# Patient Record
Sex: MALE | Race: BLACK OR AFRICAN AMERICAN | Employment: FULL TIME | URBAN - METROPOLITAN AREA
[De-identification: names, ages, dates, MRNs, and addresses within clinical notes are randomized per-mention and may not be internally consistent; named-entity substitution may affect disease eponyms.]

---

## 2017-01-30 ENCOUNTER — MED REC SCAN ONLY (OUTPATIENT)
Dept: NEPHROLOGY | Facility: CLINIC | Age: 57
End: 2017-01-30

## 2017-01-31 ENCOUNTER — OFFICE VISIT (OUTPATIENT)
Dept: NEPHROLOGY | Facility: CLINIC | Age: 57
End: 2017-01-31

## 2017-01-31 VITALS
DIASTOLIC BLOOD PRESSURE: 84 MMHG | WEIGHT: 197 LBS | SYSTOLIC BLOOD PRESSURE: 108 MMHG | HEART RATE: 74 BPM | BODY MASS INDEX: 30 KG/M2 | RESPIRATION RATE: 16 BRPM

## 2017-01-31 DIAGNOSIS — N18.4 CKD (CHRONIC KIDNEY DISEASE), STAGE 4 (SEVERE): ICD-10-CM

## 2017-01-31 DIAGNOSIS — N20.0 NEPHROLITHIASIS: Primary | ICD-10-CM

## 2017-01-31 DIAGNOSIS — R10.9 FLANK PAIN: ICD-10-CM

## 2017-01-31 PROCEDURE — 99243 OFF/OP CNSLTJ NEW/EST LOW 30: CPT | Performed by: INTERNAL MEDICINE

## 2017-01-31 RX ORDER — LISINOPRIL 5 MG/1
5 TABLET ORAL DAILY
COMMUNITY

## 2017-01-31 NOTE — PROGRESS NOTES
Nephrology Consult Note    REASON FOR CONSULT: CKD 4 / proteinuria    ASSESSMENT/PLAN:        1) CKD 4 with proteinuria- pt with history of biopsy-proven membranous nephropathy in 2012 which is commonly seen as a primary glomerulopathy but can also be asso Denies any NSAID or PPI use. He denies any history of obstructive uropathy although he has been experiencing left-sided flank pain recently. He denies any recent hospital lesions or illnesses. No other new diagnoses.   He generally feels well and remains extremities  Skin: Warm and dry, no rashes        Robbin Rojas MD  1/31/2017  12:48 PM

## 2017-04-03 ENCOUNTER — TELEPHONE (OUTPATIENT)
Dept: NEPHROLOGY | Facility: CLINIC | Age: 57
End: 2017-04-03

## 2017-04-03 NOTE — TELEPHONE ENCOUNTER
Left message for pt- Cr up from 2.6->2.9; await CT abd (ordered) to eval stone burden, etc and then consider renal biopsy pending results- gunnar coon

## 2017-04-07 ENCOUNTER — HOSPITAL ENCOUNTER (OUTPATIENT)
Dept: CT IMAGING | Age: 57
Discharge: HOME | End: 2017-04-07
Attending: INTERNAL MEDICINE
Payer: COMMERCIAL

## 2017-04-07 DIAGNOSIS — N20.0 NEPHROLITHIASIS: ICD-10-CM

## 2017-04-07 DIAGNOSIS — R10.9 FLANK PAIN: ICD-10-CM

## 2017-04-07 DIAGNOSIS — N18.4 CKD (CHRONIC KIDNEY DISEASE), STAGE 4 (SEVERE): ICD-10-CM

## 2017-04-07 PROCEDURE — 74176 CT ABD & PELVIS W/O CONTRAST: CPT

## 2017-05-02 ENCOUNTER — TELEPHONE (OUTPATIENT)
Dept: NEPHROLOGY | Facility: CLINIC | Age: 57
End: 2017-05-02

## 2017-05-02 NOTE — TELEPHONE ENCOUNTER
Left VM x 2 for pt re: CT results; no stone disease / obstruction. Cr 2.9 likely due to progression of membranous nephropathy.  Asked pt to make f/u office appt to discuss-gunnar coon

## 2017-06-05 ENCOUNTER — OFFICE VISIT (OUTPATIENT)
Dept: NEPHROLOGY | Facility: CLINIC | Age: 57
End: 2017-06-05

## 2017-06-05 VITALS
RESPIRATION RATE: 16 BRPM | WEIGHT: 199 LBS | BODY MASS INDEX: 30 KG/M2 | HEART RATE: 76 BPM | DIASTOLIC BLOOD PRESSURE: 92 MMHG | SYSTOLIC BLOOD PRESSURE: 138 MMHG

## 2017-06-05 DIAGNOSIS — N18.4 CKD (CHRONIC KIDNEY DISEASE) STAGE 4, GFR 15-29 ML/MIN (HCC): ICD-10-CM

## 2017-06-05 DIAGNOSIS — R80.9 PROTEINURIA, UNSPECIFIED: ICD-10-CM

## 2017-06-05 DIAGNOSIS — N17.9 AKI (ACUTE KIDNEY INJURY) (HCC): Primary | ICD-10-CM

## 2017-06-05 DIAGNOSIS — N02.2 MEMBRANOUS NEPHROPATHY DETERMINED BY BIOPSY: ICD-10-CM

## 2017-06-05 PROCEDURE — 99214 OFFICE O/P EST MOD 30 MIN: CPT | Performed by: INTERNAL MEDICINE

## 2017-06-05 NOTE — PROGRESS NOTES
Nephrology Progress Note      ASSESSMENT/PLAN:         1) CKD 4 with proteinuria- pt with history of biopsy-proven membranous nephropathy in 2012 which is commonly seen as a primary glomerulopathy but can also be associated with HIV and is often difficult Never Smoker                      Smokeless Status: Never Used                           Medications (Active prior to today's visit):    Current Outpatient Prescriptions:  lisinopril 5 MG Oral Tab Take 5 mg by mouth daily.  Disp:  Rfl:    EmtricMaru

## 2017-06-07 ENCOUNTER — TELEPHONE (OUTPATIENT)
Dept: NEPHROLOGY | Facility: CLINIC | Age: 57
End: 2017-06-07

## 2017-06-07 DIAGNOSIS — N18.4 CKD (CHRONIC KIDNEY DISEASE) STAGE 4, GFR 15-29 ML/MIN (HCC): Primary | ICD-10-CM

## 2017-06-07 DIAGNOSIS — R80.9 PROTEINURIA, UNSPECIFIED: ICD-10-CM

## 2018-08-08 ENCOUNTER — PATIENT OUTREACH (OUTPATIENT)
Dept: INFECTIOUS DISEASE | Facility: CLINIC | Age: 58
End: 2018-08-08

## 2018-08-08 DIAGNOSIS — M54.2 NECK PAIN: Primary | ICD-10-CM

## 2018-08-08 NOTE — PROGRESS NOTES
INFECTIOUS DISEASE PROGRESS NOTE    Sadie Berman     1960 MRN [id number]   Location: [facility]               Subjective:  :FU HIV  ---was seeing Dr. Sapna Mcnulty for renal failure and had ordered renal biopsy l

## 2018-08-10 NOTE — PROGRESS NOTES
Labs from [facility] : creat 3.34  -will need to dc truvada, switch to AZT/3TC renally adjusted, and cont isentress  Advised patient fu Dr. Wesley Helm -- he refused renal biopsy 1 year ago

## 2018-08-14 ENCOUNTER — TELEPHONE (OUTPATIENT)
Dept: NEPHROLOGY | Facility: CLINIC | Age: 58
End: 2018-08-14

## 2018-08-14 NOTE — TELEPHONE ENCOUNTER
Per almas Stone for pt to call to schedule office appt due to worsening kidney function. Any time in September is ok per Dr Jonny Priest.

## 2025-01-29 ENCOUNTER — APPOINTMENT (OUTPATIENT)
Dept: GENERAL RADIOLOGY | Facility: HOSPITAL | Age: 65
End: 2025-01-29
Attending: EMERGENCY MEDICINE
Payer: COMMERCIAL

## 2025-01-29 ENCOUNTER — PATIENT OUTREACH (OUTPATIENT)
Dept: INFECTIOUS DISEASE | Facility: CLINIC | Age: 65
End: 2025-01-29

## 2025-01-29 ENCOUNTER — HOSPITAL ENCOUNTER (INPATIENT)
Facility: HOSPITAL | Age: 65
LOS: 1 days | Discharge: LEFT BEFORE DISCHARGE | End: 2025-01-30
Attending: EMERGENCY MEDICINE | Admitting: HOSPITALIST
Payer: COMMERCIAL

## 2025-01-29 DIAGNOSIS — I16.0 HYPERTENSIVE URGENCY: ICD-10-CM

## 2025-01-29 DIAGNOSIS — N18.9 ACUTE RENAL FAILURE SUPERIMPOSED ON CHRONIC KIDNEY DISEASE, UNSPECIFIED ACUTE RENAL FAILURE TYPE, UNSPECIFIED CKD STAGE: Primary | ICD-10-CM

## 2025-01-29 DIAGNOSIS — N17.9 ACUTE RENAL FAILURE SUPERIMPOSED ON CHRONIC KIDNEY DISEASE, UNSPECIFIED ACUTE RENAL FAILURE TYPE, UNSPECIFIED CKD STAGE: Primary | ICD-10-CM

## 2025-01-29 DIAGNOSIS — Z21 HIV INFECTION, UNSPECIFIED SYMPTOM STATUS (HCC): ICD-10-CM

## 2025-01-29 PROBLEM — N18.4 CKD (CHRONIC KIDNEY DISEASE) STAGE 4, GFR 15-29 ML/MIN (HCC): Status: ACTIVE | Noted: 2025-01-29

## 2025-01-29 PROBLEM — N05.9 GLOMERULONEPHRITIS: Status: ACTIVE | Noted: 2025-01-29

## 2025-01-29 PROBLEM — E87.20 METABOLIC ACIDOSIS: Status: ACTIVE | Noted: 2025-01-29

## 2025-01-29 PROBLEM — D69.6 THROMBOCYTOPENIA: Status: ACTIVE | Noted: 2025-01-29

## 2025-01-29 PROBLEM — E87.0 HYPERNATREMIA: Status: ACTIVE | Noted: 2025-01-29

## 2025-01-29 LAB
ALBUMIN SERPL-MCNC: 4.2 G/DL (ref 3.2–4.8)
ALBUMIN/GLOB SERPL: 1.1 {RATIO} (ref 1–2)
ALP LIVER SERPL-CCNC: 60 U/L
ALT SERPL-CCNC: 22 U/L
ANION GAP SERPL CALC-SCNC: 15 MMOL/L (ref 0–18)
AST SERPL-CCNC: 15 U/L (ref ?–34)
BASOPHILS # BLD AUTO: 0.01 X10(3) UL (ref 0–0.2)
BASOPHILS NFR BLD AUTO: 0.2 %
BILIRUB SERPL-MCNC: 0.7 MG/DL (ref 0.2–1.1)
BILIRUB UR QL STRIP.AUTO: NEGATIVE
BUN BLD-MCNC: 63 MG/DL (ref 9–23)
CALCIUM BLD-MCNC: 8.9 MG/DL (ref 8.7–10.6)
CHLORIDE SERPL-SCNC: 116 MMOL/L (ref 98–112)
CLARITY UR REFRACT.AUTO: CLEAR
CO2 SERPL-SCNC: 16 MMOL/L (ref 21–32)
COLOR UR AUTO: COLORLESS
CREAT BLD-MCNC: 8.49 MG/DL
CREAT UR-SCNC: 29.3 MG/DL
EGFRCR SERPLBLD CKD-EPI 2021: 6 ML/MIN/1.73M2 (ref 60–?)
EOSINOPHIL # BLD AUTO: 0.39 X10(3) UL (ref 0–0.7)
EOSINOPHIL NFR BLD AUTO: 9.4 %
ERYTHROCYTE [DISTWIDTH] IN BLOOD BY AUTOMATED COUNT: 15.6 %
GLOBULIN PLAS-MCNC: 3.7 G/DL (ref 2–3.5)
GLUCOSE BLD-MCNC: 90 MG/DL (ref 70–99)
GLUCOSE UR STRIP.AUTO-MCNC: NORMAL MG/DL
HCT VFR BLD AUTO: 36.9 %
HGB BLD-MCNC: 12.4 G/DL
IMM GRANULOCYTES # BLD AUTO: 0.02 X10(3) UL (ref 0–1)
IMM GRANULOCYTES NFR BLD: 0.5 %
KETONES UR STRIP.AUTO-MCNC: NEGATIVE MG/DL
LEUKOCYTE ESTERASE UR QL STRIP.AUTO: NEGATIVE
LYMPHOCYTES # BLD AUTO: 0.9 X10(3) UL (ref 1–4)
LYMPHOCYTES NFR BLD AUTO: 21.8 %
MCH RBC QN AUTO: 30.2 PG (ref 26–34)
MCHC RBC AUTO-ENTMCNC: 33.6 G/DL (ref 31–37)
MCV RBC AUTO: 90 FL
MONOCYTES # BLD AUTO: 0.35 X10(3) UL (ref 0.1–1)
MONOCYTES NFR BLD AUTO: 8.5 %
NEUTROPHILS # BLD AUTO: 2.46 X10 (3) UL (ref 1.5–7.7)
NEUTROPHILS # BLD AUTO: 2.46 X10(3) UL (ref 1.5–7.7)
NEUTROPHILS NFR BLD AUTO: 59.6 %
NITRITE UR QL STRIP.AUTO: NEGATIVE
OSMOLALITY SERPL CALC.SUM OF ELEC: 322 MOSM/KG (ref 275–295)
PH UR STRIP.AUTO: 6 [PH] (ref 5–8)
PLATELET # BLD AUTO: 140 10(3)UL (ref 150–450)
POTASSIUM SERPL-SCNC: 4.7 MMOL/L (ref 3.5–5.1)
PROT SERPL-MCNC: 7.9 G/DL (ref 5.7–8.2)
PROT UR STRIP.AUTO-MCNC: 50 MG/DL
PROT UR-MCNC: 58 MG/DL (ref ?–14)
RBC # BLD AUTO: 4.1 X10(6)UL
SODIUM SERPL-SCNC: 147 MMOL/L (ref 136–145)
SP GR UR STRIP.AUTO: 1.01 (ref 1–1.03)
UROBILINOGEN UR STRIP.AUTO-MCNC: NORMAL MG/DL
WBC # BLD AUTO: 4.1 X10(3) UL (ref 4–11)

## 2025-01-29 PROCEDURE — 99223 1ST HOSP IP/OBS HIGH 75: CPT | Performed by: HOSPITALIST

## 2025-01-29 PROCEDURE — 99223 1ST HOSP IP/OBS HIGH 75: CPT | Performed by: INTERNAL MEDICINE

## 2025-01-29 PROCEDURE — 71045 X-RAY EXAM CHEST 1 VIEW: CPT | Performed by: EMERGENCY MEDICINE

## 2025-01-29 RX ORDER — BISACODYL 10 MG
10 SUPPOSITORY, RECTAL RECTAL
Status: DISCONTINUED | OUTPATIENT
Start: 2025-01-29 | End: 2025-01-30

## 2025-01-29 RX ORDER — ONDANSETRON 2 MG/ML
4 INJECTION INTRAMUSCULAR; INTRAVENOUS EVERY 4 HOURS PRN
Status: DISCONTINUED | OUTPATIENT
Start: 2025-01-29 | End: 2025-01-29 | Stop reason: HOSPADM

## 2025-01-29 RX ORDER — ACETAMINOPHEN 500 MG
500 TABLET ORAL EVERY 4 HOURS PRN
Status: DISCONTINUED | OUTPATIENT
Start: 2025-01-29 | End: 2025-01-30

## 2025-01-29 RX ORDER — PROCHLORPERAZINE EDISYLATE 5 MG/ML
5 INJECTION INTRAMUSCULAR; INTRAVENOUS EVERY 8 HOURS PRN
Status: DISCONTINUED | OUTPATIENT
Start: 2025-01-29 | End: 2025-01-30

## 2025-01-29 RX ORDER — HEPARIN SODIUM 5000 [USP'U]/ML
5000 INJECTION, SOLUTION INTRAVENOUS; SUBCUTANEOUS EVERY 12 HOURS SCHEDULED
Status: DISCONTINUED | OUTPATIENT
Start: 2025-01-29 | End: 2025-01-30

## 2025-01-29 RX ORDER — AMLODIPINE BESYLATE 10 MG/1
10 TABLET ORAL DAILY
Status: DISCONTINUED | OUTPATIENT
Start: 2025-01-29 | End: 2025-01-30

## 2025-01-29 RX ORDER — LABETALOL HYDROCHLORIDE 5 MG/ML
10 INJECTION, SOLUTION INTRAVENOUS EVERY 6 HOURS PRN
Status: DISCONTINUED | OUTPATIENT
Start: 2025-01-29 | End: 2025-01-30

## 2025-01-29 RX ORDER — HYDRALAZINE HYDROCHLORIDE 20 MG/ML
10 INJECTION INTRAMUSCULAR; INTRAVENOUS EVERY 4 HOURS PRN
Status: DISCONTINUED | OUTPATIENT
Start: 2025-01-29 | End: 2025-01-30

## 2025-01-29 RX ORDER — FUROSEMIDE 10 MG/ML
40 INJECTION INTRAMUSCULAR; INTRAVENOUS ONCE
Status: COMPLETED | OUTPATIENT
Start: 2025-01-29 | End: 2025-01-29

## 2025-01-29 RX ORDER — METOPROLOL TARTRATE 25 MG/1
25 TABLET, FILM COATED ORAL
Status: DISCONTINUED | OUTPATIENT
Start: 2025-01-29 | End: 2025-01-30

## 2025-01-29 RX ORDER — LISINOPRIL 20 MG/1
20 TABLET ORAL DAILY
Status: DISCONTINUED | OUTPATIENT
Start: 2025-01-29 | End: 2025-01-29 | Stop reason: DRUGHIGH

## 2025-01-29 RX ORDER — HYDROMORPHONE HYDROCHLORIDE 1 MG/ML
0.5 INJECTION, SOLUTION INTRAMUSCULAR; INTRAVENOUS; SUBCUTANEOUS EVERY 30 MIN PRN
Status: ACTIVE | OUTPATIENT
Start: 2025-01-29 | End: 2025-01-29

## 2025-01-29 RX ORDER — POLYETHYLENE GLYCOL 3350 17 G/17G
17 POWDER, FOR SOLUTION ORAL DAILY PRN
Status: DISCONTINUED | OUTPATIENT
Start: 2025-01-29 | End: 2025-01-30

## 2025-01-29 RX ORDER — SENNOSIDES 8.6 MG
17.2 TABLET ORAL NIGHTLY PRN
Status: DISCONTINUED | OUTPATIENT
Start: 2025-01-29 | End: 2025-01-30

## 2025-01-29 RX ORDER — ONDANSETRON 2 MG/ML
4 INJECTION INTRAMUSCULAR; INTRAVENOUS EVERY 6 HOURS PRN
Status: DISCONTINUED | OUTPATIENT
Start: 2025-01-29 | End: 2025-01-30

## 2025-01-29 RX ORDER — LISINOPRIL 2.5 MG/1
2.5 TABLET ORAL DAILY
Status: DISCONTINUED | OUTPATIENT
Start: 2025-01-29 | End: 2025-01-30

## 2025-01-29 NOTE — PROGRESS NOTES
Last seen in 2021, stopped all meds.  Here to re establish HIV care.  [facility]  He has NOT yet seen primary physician.  BP in office is 202/120, and he reports shortness of breath with exertion over the last 3 weeks.  02 sat is 96% at rest on room air, lungs are clear, no edema.  Labs show creat is 7.6.  He was advised to go to ED urgently for hypertensive urgency, sob, in the setting of A/CRF.       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

## 2025-01-29 NOTE — H&P
[facility]     Tariq Joe Patient Status:  Emergency    1960 MRN [id number]   Location [facility] EMERGENCY DEPARTMENT Attending Scot Thomas MD   Hosp Day # 0 PCP No primary care provider on file.     Chief Complaint: RAF    Subjective:    History of Present Illness:     Tariq Joe is a 64 year old male with medical history of HIV not on medications since summer of 2022 who went to see Infectious disease as an outpatient today to establish care.  He was having symptoms of shortness of breath for the past 3 weeks and was noted to have a BP of 202/120.  He had labs done which showed a Cr of 7.6 and he was advised to go to the ER for further care.    He denies a cough, fever, chills, palpitations.      History/Other:    Past Medical History:  History reviewed. No pertinent past medical history.  Past Surgical History:   History reviewed. No pertinent surgical history.   Family History:   No family history on file.  Social History:    reports that he has never smoked. He has never used smokeless tobacco. He reports that he does not drink alcohol and does not use drugs.     Allergies: Allergies[1]    Medications:  Medications Ordered Prior to Encounter[2]    Review of Systems:   A comprehensive review of systems was completed.    Pertinent positives and negatives noted in the HPI.    Objective:   Physical Exam:    BP (!) 179/125   Pulse 104   Temp 97.2 °F (36.2 °C) (Temporal)   Resp 22   Ht 5' 8\" (1.727 m)   Wt 203 lb (92.1 kg)   SpO2 97%   BMI 30.87 kg/m²   General: No acute distress, Alert  Respiratory: No rhonchi, no wheezes  Cardiovascular: S1, S2. Regular rate and rhythm  Abdomen: Soft, Non-tender, non-distended, positive bowel sounds  Neuro: No new focal deficits  Extremities: No edema      Results:    Labs:      Labs Last 24 Hours:    Recent Labs   Lab 25  1406   RBC 4.10*   HGB 12.4*   HCT 36.9*   MCV 90.0   MCH 30.2   MCHC 33.6   RDW 15.6    NEPRELIM 2.46   WBC 4.1   .0*       Recent Labs   Lab 01/29/25  1406   GLU 90   BUN 63*   CREATSERUM 8.49*   EGFRCR 6*   CA 8.9   ALB 4.2   *   K 4.7   *   CO2 16.0*   ALKPHO 60   AST 15   ALT 22   BILT 0.7   TP 7.9       Estimated Glomerular Filtration Rate: 6.5 mL/min/1.73m2 (A) (by CKD-EPI based on SCr of 8.49 mg/dL (H)).    Lab Results   Component Value Date    PT 12.8 02/17/2012    PT 14.2 08/29/2011    PT 14.3 08/25/2011    INR 0.99 02/17/2012    INR 1.09 08/29/2011    INR 1.10 08/25/2011       No results for input(s): \"TROP\", \"TROPHS\", \"CK\" in the last 168 hours.    No results for input(s): \"TROP\", \"PBNP\" in the last 168 hours.    No results for input(s): \"PCT\" in the last 168 hours.    Imaging: Imaging data reviewed in Epic.    Assessment & Plan:      #Acute renal failure  -Cr 8.49  -renal consult  -nearing HD    #Hypernatremia    #Hypertensive urgency  -not on meds for hypertension, previously on lisinopril  -start labetalol IV  -resume lisinopril  -ECHO    #Shortness of breath  -suspect due to uncontrolled HTN  -CXR clear  -ECHO    #HIV  #h/o neurosyphilis  -ID on consult  -not on meds currently  -CD4- 4, VL- 75K        Plan of care discussed with patient and ER physician    Kamla Hernandez MD    Supplementary Documentation:     The 21st Century Cures Act makes medical notes like these available to patients in the interest of transparency. Please be advised this is a medical document. Medical documents are intended to carry relevant information, facts as evident, and the clinical opinion of the practitioner. The medical note is intended as peer to peer communication and may appear blunt or direct. It is written in medical language and may contain abbreviations or verbiage that are unfamiliar.                                       [1] No Known Allergies  [2]   No current facility-administered medications on file prior to encounter.     Current Outpatient Medications on File Prior to  Encounter   Medication Sig Dispense Refill    lisinopril 5 MG Oral Tab Take 1 tablet (5 mg total) by mouth daily.      Emtricitabine-Tenofovir (TRUVADA) 200-300 MG Oral Tab 1 TABLET DAILY      Raltegravir Potassium (ISENTRESS) 400 MG Oral Tab 1 TABLET TWICE DAILY

## 2025-01-29 NOTE — CONSULTS
[facility]  Report of Consultation    Tariq Joe Patient Status:  Emergency    1960 MRN [id number]   Location [facility] EMERGENCY DEPARTMENT Attending Scot Thomas MD   Hosp Day # 0 PCP No primary care provider on file.       Assessment / Plan:    1) CKD 5 / probable ESRD- h/o biopsy proven membranous nephropathy (primary vs secondary)  Cr 1.4 -> 3.0 2017; now likely with superimposed hypertensive nephrosclerosis- doubt reversibility regardless of etiology (GN, HIV, HTN). Lytes OK; + met acidosis, mild anemia, likely somewhat volume up. PLAN- eval urine / US; will need dialysis    2) Uncontrolled HTN- exac by CKD + med noncompliance. Start BB / CCB + IV GARCÍA    3) HIV- off HAART (truvada / isentress) x 2+ yrs; eval as per ID (Dr. Mendoza)      Reason for Consultation:  RAF / CKD / HTN    History of Present Illness:  Tariq Joe is a a(n) 64 year old male. Dictation to follow    History:  History reviewed. No pertinent past medical history.  History reviewed. No pertinent surgical history.  No family history on file.  Denies family history of kidney disease.    reports that he has never smoked. He has never used smokeless tobacco. He reports that he does not drink alcohol and does not use drugs.    Allergies:  Allergies[1]    Medications:  No current facility-administered medications for this encounter.  Prior to Admission Medications   Medication Sig    lisinopril 5 MG Oral Tab Take 1 tablet (5 mg total) by mouth daily.    Emtricitabine-Tenofovir (TRUVADA) 200-300 MG Oral Tab 1 TABLET DAILY    Raltegravir Potassium (ISENTRESS) 400 MG Oral Tab 1 TABLET TWICE DAILY       Review of Systems:  Please see HPI for pertinent positives. 10 point review of systems otherwise reviewed and negative.     Physical Exam:  BP (!) 179/125   Pulse 104   Temp 97.2 °F (36.2 °C) (Temporal)   Resp 22   Ht 5' 8\" (1.727 m)   Wt 203 lb (92.1 kg)   SpO2 97%   BMI 30.87 kg/m²   Temp (24hrs), Av.2 °F  (36.2 °C), Min:97.2 °F (36.2 °C), Max:97.2 °F (36.2 °C)     No intake or output data in the 24 hours ending 01/29/25 1537  Wt Readings from Last 3 Encounters:   01/29/25 203 lb (92.1 kg)   06/05/17 199 lb (90.3 kg)   01/31/17 197 lb (89.4 kg)     General: awake alert  HEENT: No scleral icterus, MMM  Neck: Supple, no TANI or thyromegaly  Cardiac: Regular rate and rhythm, S1, S2 normal, no murmur, rub, or gallop  Lungs: Decreased breath sounds at the bases bilaterally.   Abdomen: Soft, non-tender. + bowel sounds, no palpable organomegaly  Extremities: Without clubbing, cyanosis; trace LE edema  Neurologic: Cranial nerves grossly intact, moving all extremities  Skin: Warm and dry, no rashes      Laboratory Data:  Lab Results   Component Value Date    WBC 4.1 01/29/2025    HGB 12.4 01/29/2025    HCT 36.9 01/29/2025    .0 01/29/2025    CREATSERUM 8.49 01/29/2025    BUN 63 01/29/2025     01/29/2025    K 4.7 01/29/2025     01/29/2025    CO2 16.0 01/29/2025    GLU 90 01/29/2025    CA 8.9 01/29/2025    ALB 4.2 01/29/2025    ALKPHO 60 01/29/2025    BILT 0.7 01/29/2025    TP 7.9 01/29/2025    AST 15 01/29/2025    ALT 22 01/29/2025       Imaging:  All imaging studies reviewed.      Thank you for allowing me to participate in the care of your patient.    Robbin Frances MD  1/29/2025  3:37 PM         [1] No Known Allergies

## 2025-01-29 NOTE — ED INITIAL ASSESSMENT (HPI)
Pt in from home. Pt had blood drawn a couple weeks ago and had a follow up appointment with infectious disease who recommended he come here for elevated creatinine and shortness of breath. Pt says he has shortness of breath on exertion. Pt reports bilateral ankle swelling. No pain. Pt's BP elevated in triage. 230/106 first try on left arm. 218/127 second try on right arm.

## 2025-01-29 NOTE — ED PROVIDER NOTES
Patient Seen in: [facility] Emergency Department      History     Chief Complaint   Patient presents with    Abnormal Labs     Stated Complaint: elevated creatinine    Subjective:   HPI  64-year-old male who is HIV positive who presents to the emergency department stating that he was sent here due to his elevated creatinine.  He said he has been away from seeing his infectious disease physician and renal physician for the past 5 years.  He said it was due to economic constraints.  He went to see Dr. Mendoza from infectious disease earlier this week and had laboratories drawn and was told that his creatinine was elevated and he needs to come to the hospital.  The patient is also noticed his blood pressures been elevated.  He has not been compliant with any of his medications for the past 5 years.  He said he is been noticing increased abdominal girth and occasional shortness of breath.  Denies chest pain.  Reviewing his records he was last seen earlier today for patient not range on 1/29/2025 by infectious disease and then sent here for evaluation.  He was last seen by nephrology on 8/14/2018 with a phone call for an appointment question        Objective:     History reviewed. No pertinent past medical history.           History reviewed. No pertinent surgical history.             Social History     Socioeconomic History    Marital status:  (Not Legally)   Tobacco Use    Smoking status: Never    Smokeless tobacco: Never   Substance and Sexual Activity    Alcohol use: Never    Drug use: Never                  Physical Exam     ED Triage Vitals [01/29/25 1237]   BP (!) 218/127   Pulse 105   Resp 18   Temp 97.2 °F (36.2 °C)   Temp src Temporal   SpO2 98 %   O2 Device None (Room air)       Current Vitals:   Vital Signs  BP: (!) 198/123  Pulse: 97  Resp: 22  Temp: 97.2 °F (36.2 °C)  Temp src: Temporal  MAP (mmHg): (!) 143    Oxygen Therapy  SpO2: 100 %  O2 Device: None (Room air)        Physical  Exam  General: This a pleasant nontoxic appearing patient in no apparent distress alert and oriented ×3  HEENT: Pupils are equal reactive to light.  Extra ocular motions are intact.  No scleral icterus or conjunctival pallor.   Lungs: Clear to auscultation bilaterally.  No wheezes, rhonchi, or rales appreciated.  No accessory muscle use noted for breathing.  Cardiac: Regular rate and rhythm.  Normal S1 and 2 without murmurs or ectopy appreciated  Abdomen: Soft on examination without tenderness to deep palpation or to percussion.  No masses appreciated.  Bowel sounds are normoactive.  No CVA tenderness.  Slightly protuberant  Extremities: No cyanosis, no edema or clubbing.  Pulses are +2.  Full range of motion is noted of the extremities without deformities.  No tenderness.  Neurologically intact.    ED Course     Labs Reviewed   URINALYSIS, ROUTINE - Abnormal; Notable for the following components:       Result Value    Urine Color Colorless (*)     Blood Urine 1+ (*)     Protein Urine 50 (*)     RBC Urine 3-5 (*)     Bacteria Urine Rare (*)     Squamous Epi. Cells Few (*)     All other components within normal limits   CBC WITH DIFFERENTIAL WITH PLATELET - Abnormal; Notable for the following components:    RBC 4.10 (*)     HGB 12.4 (*)     HCT 36.9 (*)     .0 (*)     Lymphocyte Absolute 0.90 (*)     All other components within normal limits   COMP METABOLIC PANEL (14) - Abnormal; Notable for the following components:    Sodium 147 (*)     Chloride 116 (*)     CO2 16.0 (*)     BUN 63 (*)     Creatinine 8.49 (*)     Calculated Osmolality 322 (*)     eGFR-Cr 6 (*)     Globulin  3.7 (*)     All other components within normal limits   G-6-PD, QUANT, BLOOD AND RBC   PROTEIN,TOTAL,URINE, RANDOM   CREATININE, URINE, RANDOM   RAINBOW DRAW LAVENDER   RAINBOW DRAW LIGHT GREEN     Reading Physician Reading Date Result Priority   Brian Tan MD  [phone] 1/29/2025      Narrative                Impression  PROCEDURE:  XR CHEST AP PORTABLE  (CPT=71045)     TECHNIQUE:  AP chest radiograph was obtained.     COMPARISON:  [address], XR, CHEST PA   LATERAL, 2/17/2010, 1:55 PM.     INDICATIONS:  elevated creatinine     PATIENT STATED HISTORY: (As transcribed by Technologist)  Pt in from home. Pt had blood drawn a couple weeks ago and had a follow up appointment with infectious disease who recommended he come here for elevated creatinine and shortness of breath. Pt says   he has shortness of breath on exertion. Pt reports bilateral ankle swelling.      FINDINGS:  Cardiac silhouette is unremarkable.  Mild apically redistribution of pulmonary vasculature.  No consolidation, pleural effusion or pneumothorax.  IMPRESSION:  Mild pulmonary vascular congestion.  No consolidation.        LOCATION:  [place] [address]                 Dictated by (CST): Brian Tan MD on 1/29/2025 at 4:09 PM      Finalized by (CST): Brian Tan MD on 1/29/2025 at 4:11 PM                MDM    Differential diagnosis includes but is not limited to congestive heart failure, pleural effusions, acute kidney injury, electrolyte abnormality, hypertensive urgency.  The patient is likely had progressively worsening kidney function over the past 5 years.  This is likely led to renovascular hypertension as well as his primary hypertension worsening his renal function.  The patient had laboratory sent.  His sodium was 147 with a chloride of 116 CO2 of 16.  BUN of 63 creatinine of 8.4.  GFR is 6.  Urinalysis 3-5 red blood cells per high-power field.  +1 blood just protein noted.  Rare bacteria few squamous cells.  Hemoglobin 12.4 hematocrit 36.9 platelet count 140,000.  Sanz, 4100.  Chest x-ray performed  I reviewed the x-rays and agree with the radiologist report that showed cardiac silhouette is unremarkable.  Mild apically restricts revision of pulmonary vasculature.  No consolidation pleural effusion or pneumothorax.  Mild pulmonary  vascular congestion.  Admission disposition: 1/29/2025  3:45 PM       Patient's blood pressure did come down to 179/125.  After much conversation the patient was finally convinced to stay in the hospital.  I did speak to the hospitalist service as well as the renal service Dr. Frances.  I also spoke to Dr. Mendoza regarding the patient.  The patient will be admitted for continued care    Medical Decision Making      Disposition and Plan     Clinical Impression:  1. Acute renal failure superimposed on chronic kidney disease, unspecified acute renal failure type, unspecified CKD stage    2. HIV infection, unspecified symptom status ([facility])    3. Hypertensive urgency         Disposition:  Admit  1/29/2025  3:45 pm    Follow-up:  No follow-up provider specified.        Medications Prescribed:  Current Discharge Medication List              Supplementary Documentation:         Hospital Problems       Present on Admission  Date Reviewed: 6/5/2017            ICD-10-CM Noted POA    * (Principal) Acute renal failure superimposed on chronic kidney disease, unspecified acute renal failure type, unspecified CKD stage N17.9, N18.9 1/29/2025 Unknown    RAF (acute kidney injury) N17.9 1/29/2025 Unknown    CKD (chronic kidney disease) stage 4, GFR 15-29 ml/min ([facility]) N18.4 1/29/2025 Unknown    Glomerulonephritis N05.9 1/29/2025 Unknown    HIV infection (HCC) Z21 1/23/2012 Yes    HIV infection, unspecified symptom status ([facility]) Z21 1/29/2025 Unknown    Hypernatremia E87.0 1/29/2025 Yes    Hypertensive urgency I16.0 1/29/2025 Unknown    Metabolic acidosis E87.20 1/29/2025 Yes    Thrombocytopenia ([facility]) D69.6 1/29/2025 Yes

## 2025-01-30 ENCOUNTER — APPOINTMENT (OUTPATIENT)
Dept: CV DIAGNOSTICS | Facility: HOSPITAL | Age: 65
End: 2025-01-30
Attending: HOSPITALIST
Payer: COMMERCIAL

## 2025-01-30 VITALS
RESPIRATION RATE: 15 BRPM | OXYGEN SATURATION: 95 % | HEIGHT: 68 IN | TEMPERATURE: 98 F | HEART RATE: 87 BPM | WEIGHT: 190.19 LBS | DIASTOLIC BLOOD PRESSURE: 110 MMHG | SYSTOLIC BLOOD PRESSURE: 152 MMHG | BODY MASS INDEX: 28.82 KG/M2

## 2025-01-30 PROBLEM — R80.9 PROTEINURIA: Status: ACTIVE | Noted: 2025-01-30

## 2025-01-30 PROBLEM — I47.10 SVT (SUPRAVENTRICULAR TACHYCARDIA) (HCC): Status: ACTIVE | Noted: 2025-01-30

## 2025-01-30 LAB
ANION GAP SERPL CALC-SCNC: 15 MMOL/L (ref 0–18)
ATRIAL RATE: 50 BPM
BASOPHILS # BLD AUTO: 0.01 X10(3) UL (ref 0–0.2)
BASOPHILS NFR BLD AUTO: 0.3 %
BUN BLD-MCNC: 68 MG/DL (ref 9–23)
CALCIUM BLD-MCNC: 8.7 MG/DL (ref 8.7–10.6)
CHLORIDE SERPL-SCNC: 115 MMOL/L (ref 98–112)
CHOLEST SERPL-MCNC: 135 MG/DL (ref ?–200)
CO2 SERPL-SCNC: 18 MMOL/L (ref 21–32)
CREAT BLD-MCNC: 8.37 MG/DL
EGFRCR SERPLBLD CKD-EPI 2021: 7 ML/MIN/1.73M2 (ref 60–?)
EOSINOPHIL # BLD AUTO: 0.36 X10(3) UL (ref 0–0.7)
EOSINOPHIL NFR BLD AUTO: 10.8 %
ERYTHROCYTE [DISTWIDTH] IN BLOOD BY AUTOMATED COUNT: 15.4 %
GLUCOSE BLD-MCNC: 85 MG/DL (ref 70–99)
HBV CORE AB SERPL QL IA: NONREACTIVE
HBV SURFACE AB SER QL: NONREACTIVE
HBV SURFACE AB SERPL IA-ACNC: <3.1 MIU/ML
HBV SURFACE AG SER-ACNC: <0.1 [IU]/L
HBV SURFACE AG SERPL QL IA: NONREACTIVE
HCT VFR BLD AUTO: 37.4 %
HCV AB SERPL QL IA: NONREACTIVE
HDLC SERPL-MCNC: 40 MG/DL (ref 40–59)
HGB BLD-MCNC: 12.4 G/DL
IMM GRANULOCYTES # BLD AUTO: 0.02 X10(3) UL (ref 0–1)
IMM GRANULOCYTES NFR BLD: 0.6 %
LDLC SERPL CALC-MCNC: 82 MG/DL (ref ?–100)
LYMPHOCYTES # BLD AUTO: 0.81 X10(3) UL (ref 1–4)
LYMPHOCYTES NFR BLD AUTO: 24.4 %
MCH RBC QN AUTO: 30.1 PG (ref 26–34)
MCHC RBC AUTO-ENTMCNC: 33.2 G/DL (ref 31–37)
MCV RBC AUTO: 90.8 FL
MONOCYTES # BLD AUTO: 0.32 X10(3) UL (ref 0.1–1)
MONOCYTES NFR BLD AUTO: 9.6 %
NEUTROPHILS # BLD AUTO: 1.8 X10 (3) UL (ref 1.5–7.7)
NEUTROPHILS # BLD AUTO: 1.8 X10(3) UL (ref 1.5–7.7)
NEUTROPHILS NFR BLD AUTO: 54.3 %
NONHDLC SERPL-MCNC: 95 MG/DL (ref ?–130)
OSMOLALITY SERPL CALC.SUM OF ELEC: 325 MOSM/KG (ref 275–295)
PHOSPHATE SERPL-MCNC: 7 MG/DL (ref 2.4–5.1)
PLATELET # BLD AUTO: 150 10(3)UL (ref 150–450)
POTASSIUM SERPL-SCNC: 4.6 MMOL/L (ref 3.5–5.1)
PTH-INTACT SERPL-MCNC: 731.6 PG/ML (ref 18.5–88)
Q-T INTERVAL: 320 MS
QRS DURATION: 98 MS
QTC CALCULATION (BEZET): 508 MS
R AXIS: 21 DEGREES
RBC # BLD AUTO: 4.12 X10(6)UL
SODIUM SERPL-SCNC: 148 MMOL/L (ref 136–145)
T AXIS: 154 DEGREES
TRIGL SERPL-MCNC: 59 MG/DL (ref 30–149)
TROPONIN I SERPL HS-MCNC: 32 NG/L
TSI SER-ACNC: 0.99 UIU/ML (ref 0.55–4.78)
VENTRICULAR RATE: 152 BPM
VLDLC SERPL CALC-MCNC: 9 MG/DL (ref 0–30)
WBC # BLD AUTO: 3.3 X10(3) UL (ref 4–11)

## 2025-01-30 PROCEDURE — 99291 CRITICAL CARE FIRST HOUR: CPT | Performed by: STUDENT IN AN ORGANIZED HEALTH CARE EDUCATION/TRAINING PROGRAM

## 2025-01-30 PROCEDURE — 99239 HOSP IP/OBS DSCHRG MGMT >30: CPT | Performed by: HOSPITALIST

## 2025-01-30 PROCEDURE — 99233 SBSQ HOSP IP/OBS HIGH 50: CPT | Performed by: INTERNAL MEDICINE

## 2025-01-30 PROCEDURE — 93306 TTE W/DOPPLER COMPLETE: CPT | Performed by: HOSPITALIST

## 2025-01-30 RX ORDER — DILTIAZEM HYDROCHLORIDE 5 MG/ML
INJECTION INTRAVENOUS
Status: COMPLETED
Start: 2025-01-30 | End: 2025-01-30

## 2025-01-30 RX ORDER — LAMIVUDINE 100 MG/1
100 TABLET, FILM COATED ORAL DAILY
Qty: 90 TABLET | Refills: 0 | Status: SHIPPED | OUTPATIENT
Start: 2025-01-30

## 2025-01-30 RX ORDER — AMLODIPINE BESYLATE 10 MG/1
10 TABLET ORAL DAILY
Status: DISCONTINUED | OUTPATIENT
Start: 2025-01-30 | End: 2025-01-30

## 2025-01-30 NOTE — PROGRESS NOTES
Called by nurse to speak to patient about plan to leave hospital today.   He is unwilling to start dialysis.  He was advised of the critical nature of both cardiomyopathy and ESRD, and without dialysis treatments he may very well suffer a sudden death in the near future.  HIV disease is treatable, even advanced this far with CD4 4,  and agreable to meds.  He said we would not go on dialysis even if it is a life threatening situation.  In his words, I cannot \"mix up man and machine\".  Essentially violating his core principals.  Will contact Dr. Frances. Should he not waiver from this choice, recommend palliative care consult.

## 2025-01-30 NOTE — SIGNIFICANT EVENT
[facility]  RAPID RESPONSE NOTE     Tariq Joe Patient Status:  Inpatient    1960 MRN [id number]   Location [facility] 7NE-A Attending Kamla Hernandez MD   Hosp Day # 1 PCP No primary care provider on file.     Subjective:      - RN notified that pt had sudden onset of SVT, rates in 150s after getting up to go to the bathroom. Pt feeling well, denies cp, palpitations, SOB. No known hx Afib/flutter or arrhythmia       Physical Exam:    BP (!) 159/106 (BP Location: Left arm)   Pulse 87   Temp 98.6 °F (37 °C) (Oral)   Resp 14   Ht 5' 8\" (1.727 m)   Wt 203 lb (92.1 kg)   SpO2 95%   BMI 30.87 kg/m²   General: NAD  Respiratory: CTAB  Cardiovascular: Tachycardic, regular  Abdomen: Soft NT ND  Neurologic: No focal deficits  Extremities: WWP    Diagnostic Data:      Labs: Reviewed in EMR    Imaging: Reviewed in EMR    ASSESSMENT / PLAN:     # SVT   - EKG obtained showing SVT, suspect Aflutter given regular rate    - pt responded to IV diltiazem 10mg bolus, started on diltiazem ggt   - TTE already ordered previously, TSH ordered   - Cardiology consulted     Upon my evaluation, this patient had a high probability of imminent or life-threatening deterioration due to arrhythmia , which required my direct attention, intervention, and personal management.    I have personally provided 32 minutes of critical care time, exclusive of time spent on separately billable procedures.  Time includes review of all pertinent laboratory/radiology results, discussion with consultants, and monitoring for potential decompensation.  Performed interventions included anti-arrhythmics.    Luciano Dixon MD  2025

## 2025-01-30 NOTE — PROGRESS NOTES
[facility]   part of [facility]     Hospitalist Progress Note     Tariq Joe Patient Status:  Inpatient    1960 MRN [id number]   Location [facility] 7NE-A Attending Kamla Hernandez MD   Hosp Day # 1 PCP No primary care provider on file.     Chief Complaint: RAF    Subjective:     Patient developed SVT overnight, denies complaints at this time, refusing HD.    Objective:    Review of Systems:   A comprehensive review of systems was completed; pertinent positive and negatives stated in subjective.    Vital signs:  Temp:  [97.2 °F (36.2 °C)-98.6 °F (37 °C)] 98.4 °F (36.9 °C)  Pulse:  [] 79  Resp:  [13-26] 13  BP: (135-218)/() 150/109  SpO2:  [92 %-100 %] 92 %    Physical Exam:    General: No acute distress  Respiratory: No wheezes, no rhonchi  Cardiovascular: S1, S2, regular rate and rhythm  Abdomen: Soft, Non-tender, non-distended, positive bowel sounds  Neuro: No new focal deficits.   Extremities: No edema      Diagnostic Data:    Labs:  Recent Labs   Lab 25  1406 25  0627   WBC 4.1 3.3*   HGB 12.4* 12.4*   MCV 90.0 90.8   .0* 150.0       Recent Labs   Lab 25  1406 25  0627   GLU 90 85   BUN 63* 68*   CREATSERUM 8.49* 8.37*   CA 8.9 8.7   ALB 4.2  --    * 148*   K 4.7 4.6   * 115*   CO2 16.0* 18.0*   ALKPHO 60  --    AST 15  --    ALT 22  --    BILT 0.7  --    TP 7.9  --        Estimated Glomerular Filtration Rate: 6.6 mL/min/1.73m2 (A) (by CKD-EPI based on SCr of 8.37 mg/dL (H)).    Recent Labs   Lab 25  0627   TROPHS 32       No results for input(s): \"PTP\", \"INR\" in the last 168 hours.    Lab Results   Component Value Date    TSH 0.991 2025             Microbiology    No results found for this visit on 25.      Imaging: Reviewed in Epic.    Medications:    dilTIAZem  10 mg Intravenous Once    heparin  5,000 Units Subcutaneous 2 times per day    metoprolol tartrate  25 mg Oral 2x Daily(Beta Blocker)    lisinopril  2.5  mg Oral Daily       Assessment & Plan:      #Acute renal failure on CKD approaching ESRD  -Cr 8.37  -renal consult  -nearing HD    #SVT/atrial flutter  Now on cardizem gtt  Echo pending  Cardiology to eval   Risk for uremic pericarditis      #Hypernatremia, slightly higher today, per renal     #Hypertensive urgency  -BP improved  -started on ACE  -echo pending    #Shortness of breath  -suspect due to uncontrolled HTN  -CXR clear  -ECHO pending     #HIV  #h/o neurosyphilis  -ID on consult  -not on meds currently  -CD4- 4, VL- 75K  -resume HIV meds (renally adjusted)  -Bactrim for prophylaxis after HD      Bette Pickard MD    Supplementary Documentation:     Quality:  DVT Mechanical Prophylaxis:   SCDs,    DVT Pharmacologic Prophylaxis   Medication    heparin (Porcine) 5000 UNIT/ML injection 5,000 Units                Code Status: Not on file  Landa: No urinary catheter in place  Landa Duration (in days):   Central line:    CLYDE:     Discharge is dependent on: progress  At this point Mr. Joe is expected to be discharge to: home    The 21st Century Cures Act makes medical notes like these available to patients in the interest of transparency. Please be advised this is a medical document. Medical documents are intended to carry relevant information, facts as evident, and the clinical opinion of the practitioner. The medical note is intended as peer to peer communication and may appear blunt or direct. It is written in medical language and may contain abbreviations or verbiage that are unfamiliar.

## 2025-01-30 NOTE — PROGRESS NOTES
[facility]  Nephrology Progress Note    Tariq Joe Attending:  Kamla Hernandez MD       Assessment and Plan:    1) CKD 5 / probable ESRD- h/o biopsy proven membranous nephropathy (primary vs secondary)  Cr 1.4 -> 3.0 ; now likely with superimposed hypertensive nephrosclerosis- doubt reversibility regardless of etiology (GN, HIV, HTN). Lytes OK; + met acidosis, mild anemia, likely somewhat volume up. 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 PLAN- await US      2) Uncontrolled HTN- exac by CKD + med noncompliance. Significantly better on metoprolol / amlodipine     3) HIV- off HAART (truvada / isentress) x 2+ yrs; CD4 = 4; mgmt as per Dr. Mendoza. Avoid bactrim for PJP px until starting dialysis    4) SVT overnight- echo pending; on cardizem; cards to see    D/W pt at length- strongly recommend initiation of dialysis given uremia with eGFR << 10 ml/min. Will need monthly labs and close outpt f/u if he declines; also discussed transplant evaluation.       Subjective:  Awake alert feeling somewhat better overall    Physical Exam:   BP (!) 150/109 (BP Location: Left arm)   Pulse 79   Temp 98.4 °F (36.9 °C) (Oral)   Resp 13   Ht 5' 8\" (1.727 m)   Wt 190 lb 3.2 oz (86.3 kg)   SpO2 92%   BMI 28.92 kg/m²   Temp (24hrs), Av.3 °F (36.8 °C), Min:97.2 °F (36.2 °C), Max:98.6 °F (37 °C)       Intake/Output Summary (Last 24 hours) at 2025 1030  Last data filed at 2025 0810  Gross per 24 hour   Intake 140 ml   Output 700 ml   Net -560 ml     Wt Readings from Last 3 Encounters:   25 190 lb 3.2 oz (86.3 kg)   17 199 lb (90.3 kg)   17 197 lb (89.4 kg)     General: awake  HEENT: No scleral icterus, MMM  Neck: Supple, no TANI or thyromegaly  Cardiac: Regular rate and rhythm, S1, S2 normal, no murmur or tub  Lungs: Decreased BS at bases bilaterally   Abdomen: Soft, non-tender. + bowel sounds, no palpable  organomegaly  Extremities: Without clubbing, cyanosis; no edema  Neurologic: Cranial nerves grossly intact, moving all extremities  Skin: Warm and dry, no rashes       Labs:   Lab Results   Component Value Date    WBC 3.3 01/30/2025    HGB 12.4 01/30/2025    HCT 37.4 01/30/2025    .0 01/30/2025    CREATSERUM 8.37 01/30/2025    BUN 68 01/30/2025     01/30/2025    K 4.6 01/30/2025     01/30/2025    CO2 18.0 01/30/2025    GLU 85 01/30/2025    CA 8.7 01/30/2025    ALB 4.2 01/29/2025    ALKPHO 60 01/29/2025    BILT 0.7 01/29/2025    TP 7.9 01/29/2025    AST 15 01/29/2025    ALT 22 01/29/2025    TSH 0.991 01/29/2025    PHOS 7.0 01/30/2025       Imaging:  All imaging studies reviewed.    Meds:   Current Facility-Administered Medications   Medication Dose Route Frequency    dilTIAZem 10 mg BOLUS FROM BAG infusion  10 mg Intravenous Once    dilTIAZem (cardIZEM) 100 mg in sodium chloride 0.9% 100 mL IVPB-ADDV  2.5-20 mg/hr Intravenous Continuous    labetalol (Trandate) 5 mg/mL injection 10 mg  10 mg Intravenous Q6H PRN    heparin (Porcine) 5000 UNIT/ML injection 5,000 Units  5,000 Units Subcutaneous 2 times per day    acetaminophen (Tylenol Extra Strength) tab 500 mg  500 mg Oral Q4H PRN    ondansetron (Zofran) 4 MG/2ML injection 4 mg  4 mg Intravenous Q6H PRN    prochlorperazine (Compazine) 10 MG/2ML injection 5 mg  5 mg Intravenous Q8H PRN    polyethylene glycol (PEG 3350) (Miralax) 17 g oral packet 17 g  17 g Oral Daily PRN    sennosides (Senokot) tab 17.2 mg  17.2 mg Oral Nightly PRN    bisacodyl (Dulcolax) 10 MG rectal suppository 10 mg  10 mg Rectal Daily PRN    metoprolol tartrate (Lopressor) tab 25 mg  25 mg Oral 2x Daily(Beta Blocker)    hydrALAzine (Apresoline) 20 mg/mL injection 10 mg  10 mg Intravenous Q4H PRN    influenza virus trivalent PF (Fluzone Trivalent) 0.5 mL IM injection (ages 6 months to 64 years) 0.5 mL  0.5 mL Intramuscular Prior to discharge    lisinopril (Prinivil; Zestril) tab  2.5 mg  2.5 mg Oral Daily         Questions/concerns were discussed with patient and/or family by bedside.          Robbin Frances MD  1/30/2025  10:30 AM

## 2025-01-30 NOTE — CONSULTS
INFECTIOUS DISEASE CONSULTATION    Tariq Joe Patient Status:  Inpatient    1960 MRN [id number]   [facility] 7NE-A Attending Kamla Hernandez MD   Hosp Day # 1 PCP No primary care provider on file.       Requested by Dr. Hernandez    Reason for Consultation:  HIV infection stage 3, long time intermittent non adherent, admitted for worsening CKD    History of Present Illness:  Tariq Joe is a a(n) 64 year old male, diagnosed with HIV in , 22 years ago.   He was well controlled on ART, but had stopped all meds, and lost to follow up since 2019. He came to my office yesterday to reestablish care.  Labs were done prior to the appotintment at [facility] but were not sent to my office.   During appointment we learned creat was 7.6.  He also had elevated /120, and was complaining of dyspnea on exertion. He was sent to ED for evaluations, and was admitted.   Dr. Frances is reccomending hemodialysis.     He was a longstanding patient of Dr. Patel, who referred him to me in , at the time of diagnosis,   He reports being heterosexual and denies history of IVDA.  He was formally in the . He is  over a decade ago.    He was asymptomatic at diagnosis in , and CD4 was > 500. He was initially treated with trizivir for a few years, but then stopped and lost to follow up until .    In  he was brought back into care when he presented with MRSA soft tissue infection.  CD4 had dropped to 70 in , and was started on atripla.  CD4 improved to 276 and viral load was undetectable by .  He was switched to Truvada and isentress in .  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  He received PCN x 3 doses after that.   He did develop symptoms of peripheral neuropathy and underwent an LP ijn .  CSF VDRL was positive, but did not have pleocytosis.   He was treated with IV PCN x 2 weeks  after that.   He had been adherent with antiretroviral for several years, but then was lost to follow up since 2019.  He came to see me again in 2021, but did not get labs done, and didn't return after that for 4 more years.    He has been diagnosed with CKD, creat was up to 3.0 in 2018.  He had a renal biopsy done, but had not been following with Dr. Frances after that.        Hepatitis screening negative in the past.    History:  History reviewed. No pertinent past medical history.  History reviewed. No pertinent surgical history.  No family history on file.   reports that he has never smoked. He has never used smokeless tobacco. He reports that he does not drink alcohol and does not use drugs.      Allergies:  Allergies[1]    Medications:    Current Facility-Administered Medications:     dilTIAZem 10 mg BOLUS FROM BAG infusion, 10 mg, Intravenous, Once    dilTIAZem (cardIZEM) 100 mg in sodium chloride 0.9% 100 mL IVPB-ADDV, 2.5-20 mg/hr, Intravenous, Continuous    labetalol (Trandate) 5 mg/mL injection 10 mg, 10 mg, Intravenous, Q6H PRN    heparin (Porcine) 5000 UNIT/ML injection 5,000 Units, 5,000 Units, Subcutaneous, 2 times per day    acetaminophen (Tylenol Extra Strength) tab 500 mg, 500 mg, Oral, Q4H PRN    ondansetron (Zofran) 4 MG/2ML injection 4 mg, 4 mg, Intravenous, Q6H PRN    prochlorperazine (Compazine) 10 MG/2ML injection 5 mg, 5 mg, Intravenous, Q8H PRN    polyethylene glycol (PEG 3350) (Miralax) 17 g oral packet 17 g, 17 g, Oral, Daily PRN    sennosides (Senokot) tab 17.2 mg, 17.2 mg, Oral, Nightly PRN    bisacodyl (Dulcolax) 10 MG rectal suppository 10 mg, 10 mg, Rectal, Daily PRN    metoprolol tartrate (Lopressor) tab 25 mg, 25 mg, Oral, 2x Daily(Beta Blocker)    hydrALAzine (Apresoline) 20 mg/mL injection 10 mg, 10 mg, Intravenous, Q4H PRN    influenza virus trivalent PF (Fluzone Trivalent) 0.5 mL IM injection (ages 6 months to 64 years) 0.5 mL, 0.5 mL,  Intramuscular, Prior to discharge    lisinopril (Prinivil; Zestril) tab 2.5 mg, 2.5 mg, Oral, Daily  Medications Ordered Prior to Encounter[2]    Review of Systems:    A comprehensive 10 point review of systems was completed.  Pertinent positives and negatives noted in the the HPI.      Physical Exam:    Awake   Vital signs: Temp:  [97.2 °F (36.2 °C)-98.6 °F (37 °C)] 98.4 °F (36.9 °C)  Pulse:  [] 91  Resp:  [14-26] 21  BP: (135-218)/() 177/106  SpO2:  [95 %-100 %] 95 %  Body mass index is 28.92 kg/m².  HEENT: Moist mucous membranes. Extraocular muscles are intact.  Neck: No swelling, no masses  Respiratory: Non labored, symmetric exursion  Cardiovascular: No irregularities in rhythm  Abdomen: Soft, nontender, nondistended.    Musculoskeletal: Full range of motion of all extremities.  No swelling noted.  Joints: no effusions  Skin: No lesions. No erythema, no open wounds    Laboratory Data:  Laboratory data reviewed      Recent Labs   Lab 01/30/25  0627   RBC 4.12*   HGB 12.4*   HCT 37.4*   MCV 90.8   MCH 30.1   MCHC 33.2   RDW 15.4   NEPRELIM 1.80   WBC 3.3*   .0       Recent Labs   Lab 01/29/25  1406 01/30/25  0627   GLU 90 85   BUN 63* 68*   CREATSERUM 8.49* 8.37*   CA 8.9 8.7   ALB 4.2  --    * 148*   K 4.7 4.6   * 115*   CO2 16.0* 18.0*   ALKPHO 60  --    AST 15  --    ALT 22  --    BILT 0.7  --    TP 7.9  --          Lab Results   Component Value Date    TSH 0.991 01/29/2025    PHOS 7.0 01/30/2025         Microbiologic Data:   No results found for this visit on 01/29/25.      Imaging:  CXR noted  Established Problem list:  Patient Active Problem List   Diagnosis    HIV infection (HCC)    Elevated sed rate    Multifactorial peripheral neuropathy    Membranous nephropathy    Neurosyphilis (HCC)    Hypernatremia    Thrombocytopenia (HCC)    Metabolic acidosis    RAF (acute kidney injury)    CKD (chronic kidney disease) stage 4, GFR 15-29 ml/min (HCC)    Glomerulonephritis     Hypertensive urgency    Acute renal failure superimposed on chronic kidney disease, unspecified acute renal failure type, unspecified CKD stage    HIV infection, unspecified symptom status (HCC)    SVT (supraventricular tachycardia) ([facility])       ASSESSMENT/PLAN:  1. HIV stage 3  -CD4 was > 500 at diagnosis in 2003  Had been well controlled for years, but then stopped meds in 2019 (see HPI)  -  CD4, now 4, and viral load 75K, no history of resistant mutations on genotype, recent genoytpe no mutations    Can start renal adjusted lamivudine + Dolrutegravir  OI prophylaxis  Bactrim DS 1 tab 3x/week, after starts dialysis  If no dialysis would avoid risk of hyperkalemia  Consider dapsone as alternative, check G6PD first    2. Secondary syphillis in 2012, and possible neurosyphillus  Baseline RPR was 1:4,096 in 2012  Recently 1:32  -review old labs    3. ESRD  Admitted with SOB, hypertension, mild pulmonary edema  Dr. Frances recommending HD  Encouraged patient to initiate HD      4. HTN with complications /on meds per IM  Non adherent, restablish with primary care    5. Cardiac  Risk for CAD as well as uremic pericarditis, and HTN disease    Multiple risk factors and was on no meds,  sob prior to admission, SVT  -longstanding HTN, not treated,   Cardiac workup in progress      6. SOB  Evidence of mild pulmonary edema in association with ESRD  Cardiac workup in progress, troponin and ECHO pending    Risk of PJP with CD4 4, not on 02  -follow serial CXR with dialysis  -bactrim prophylaxis to be intitated when dialysis starts          Praful Mendoza MD, MD [facility]  [phone]         [1] No Known Allergies  [2]   No current facility-administered medications on file prior to encounter.     Current Outpatient Medications on File Prior to Encounter   Medication Sig Dispense Refill    lisinopril 5 MG Oral Tab Take 1 tablet (5 mg total) by mouth daily.      Emtricitabine-Tenofovir (TRUVADA)  200-300 MG Oral Tab 1 TABLET DAILY      Raltegravir Potassium (ISENTRESS) 400 MG Oral Tab 1 TABLET TWICE DAILY

## 2025-01-30 NOTE — PLAN OF CARE
NURSING ADMISSION NOTE    Patient admitted via Cart  Oriented to room.  Safety precautions initiated.  Bed in low position.  Call light in reach.    A&Ox4,VSS, up with standby assist   No complaints of pain   Tolerating diet  Admission navigator complete   Patient updated on POC   All questions answered   Call light within reach

## 2025-01-30 NOTE — PLAN OF CARE
Assumed care at 0730  Patient alert, oriented x 4  VSS, RA throughout shift, NSR on tele  Cardizem gtt stopped this AM  Denies pain  Up adlib  Voiding in bathroom  Tolerating diet well  Call light within reach

## 2025-01-30 NOTE — PLAN OF CARE
Risks/benefits/alternatives discussed with patient as applicable to reason for leaving AMA? Yes  Provider(s) contacted: Dr. Frances, Dr. Mendoza, Cardiology REINALDO March, Dr. Pickard    Patient education/AVS/follow-up appointments/prescriptions given? yes  AMA paperwork completed? yes    Removed IV access/decannulated port if applicable and patient belongings returned.    Documented from Admission Navigator:  Belongings at Bedside: Vision  Vision - Corrective Lenses: Glasses  Belongings Sent to Public Safety: None  Medications brought by patient?: No

## 2025-01-30 NOTE — PLAN OF CARE
0015: Patient noted to be SVT on monitor after ambulating to washroom. Patient asymptomatic, denies chest pain, BP at baseline slightly elevated. Bedside EKG confirmed SVT, Phi hospitalist called, immediately came to bedside. CCU charge RN at bedside as well. Patient given 10mg bolus Cardizem to which he responded well quickly. Cardizem gtt started, heart rate currently in the 90's. Cardiology put on consult, will follow Cardizem orders set.    Problem: acute renal failure  Data: Patient alert and oriented overnight, on room air, denies pain, non pitting edema noted to bilateral ankles. Patient up in room as tolerated, voiding freely, vital signs stable, NSR/ST on tele.   Action: Due medications given, urine sample sent per order, all patient's needs attended to.   Education (patient/family): Patient updated on plan of care. Plan for kidney/bladder US and 2Decho in AM.  Response: Patient verbalizes understanding of plan of care and appears to be resting comfortably at this time.    Problem: Patient/Family Goals  Goal: Patient/Family Long Term Goal  Description: Patient's Long Term Goal: DC home    Interventions:  - comply with plan of care  - See additional Care Plan goals for specific interventions  Outcome: Progressing  Goal: Patient/Family Short Term Goal  Description: Patient's Short Term Goal: have less swelling    Interventions:   - kidney/bladder US  -2D echo, renal consult  - See additional Care Plan goals for specific interventions  Outcome: Progressing     Problem: CARDIOVASCULAR - ADULT  Goal: Maintains optimal cardiac output and hemodynamic stability  Description: INTERVENTIONS:  - Monitor vital signs, rhythm, and trends  - Monitor for bleeding, hypotension and signs of decreased cardiac output  - Evaluate effectiveness of vasoactive medications to optimize hemodynamic stability  - Monitor arterial and/or venous puncture sites for bleeding and/or hematoma  - Assess quality of pulses, skin color and  temperature  - Assess for signs of decreased coronary artery perfusion - ex. Angina  - Evaluate fluid balance, assess for edema, trend weights  Outcome: Progressing     Problem: METABOLIC/FLUID AND ELECTROLYTES - ADULT  Goal: Electrolytes maintained within normal limits  Description: INTERVENTIONS:  - Monitor labs and rhythm and assess patient for signs and symptoms of electrolyte imbalances  - Administer electrolyte replacement as ordered  - Monitor response to electrolyte replacements, including rhythm and repeat lab results as appropriate  - Fluid restriction as ordered  - Instruct patient on fluid and nutrition restrictions as appropriate  Outcome: Progressing

## 2025-01-30 NOTE — PAYOR COMM NOTE
ADMISSION REVIEW     Payor: MARTIN NEVES  Subscriber #:  [id number]  Authorization Number: [id number]    Admit date: 1/29/25  Admit time:  5:53 PM       REVIEW DOCUMENTATION:     ED Provider Notes        ED Provider Notes signed by Scot Thomas MD at 1/29/2025  5:59 PM        Chief Complaint   Patient presents with    Abnormal Labs     Stated Complaint: elevated creatinine    Subjective:   HPI  64-year-old male who is HIV positive who presents to the emergency department stating that he was sent here due to his elevated creatinine.  He said he has been away from seeing his infectious disease physician and renal physician for the past 5 years.  He said it was due to economic constraints.  He went to see Dr. Mendoza from infectious disease earlier this week and had laboratories drawn and was told that his creatinine was elevated and he needs to come to the hospital.  The patient is also noticed his blood pressures been elevated.  He has not been compliant with any of his medications for the past 5 years.  He said he is been noticing increased abdominal girth and occasional shortness of breath.  Denies chest pain.  Reviewing his records he was last seen earlier today for patient not range on 1/29/2025 by infectious disease and then sent here for evaluation.  He was last seen by nephrology on 8/14/2018 with a phone call for an appointment question        Physical Exam     ED Triage Vitals [01/29/25 1237]   BP (!) 218/127   Pulse 105   Resp 18   Temp 97.2 °F (36.2 °C)   Temp src Temporal   SpO2 98 %   O2 Device None (Room air)       Current Vitals:   Vital Signs  BP: (!) 198/123  Pulse: 97  Resp: 22  Temp: 97.2 °F (36.2 °C)  Temp src: Temporal  MAP (mmHg): (!) 143    Oxygen Therapy  SpO2: 100 %  O2 Device: None (Room air)        Physical Exam  General: This a pleasant nontoxic appearing patient in no apparent distress alert and oriented ×3  HEENT: Pupils are equal reactive to light.  Extra ocular motions are  intact.  No scleral icterus or conjunctival pallor.   Lungs: Clear to auscultation bilaterally.  No wheezes, rhonchi, or rales appreciated.  No accessory muscle use noted for breathing.  Cardiac: Regular rate and rhythm.  Normal S1 and 2 without murmurs or ectopy appreciated  Abdomen: Soft on examination without tenderness to deep palpation or to percussion.  No masses appreciated.  Bowel sounds are normoactive.  No CVA tenderness.  Slightly protuberant  Extremities: No cyanosis, no edema or clubbing.  Pulses are +2.  Full range of motion is noted of the extremities without deformities.  No tenderness.  Neurologically intact.    ED Course     Labs Reviewed   URINALYSIS, ROUTINE - Abnormal; Notable for the following components:       Result Value    Urine Color Colorless (*)     Blood Urine 1+ (*)     Protein Urine 50 (*)     RBC Urine 3-5 (*)     Bacteria Urine Rare (*)     Squamous Epi. Cells Few (*)     All other components within normal limits   CBC WITH DIFFERENTIAL WITH PLATELET - Abnormal; Notable for the following components:    RBC 4.10 (*)     HGB 12.4 (*)     HCT 36.9 (*)     .0 (*)     Lymphocyte Absolute 0.90 (*)     All other components within normal limits   COMP METABOLIC PANEL (14) - Abnormal; Notable for the following components:    Sodium 147 (*)     Chloride 116 (*)     CO2 16.0 (*)     BUN 63 (*)     Creatinine 8.49 (*)     Calculated Osmolality 322 (*)     eGFR-Cr 6 (*)     Globulin  3.7 (*)     All other components within normal limits   G-6-PD, QUANT, BLOOD AND RBC   PROTEIN,TOTAL,URINE, RANDOM   CREATININE, URINE, RANDOM   RAINBOW DRAW LAVENDER   RAINBOW DRAW LIGHT GREEN     Reading Physician Reading Date Result Priority   Brian Tan MD  [phone] 1/29/2025      Narrative               Impression  PROCEDURE:  XR CHEST AP PORTABLE  (CPT=71045)     TECHNIQUE:  AP chest radiograph was obtained.     COMPARISON:  [address], XR, CHEST PA   LATERAL, 2/17/2010, 1:55 PM.      INDICATIONS:  elevated creatinine     PATIENT STATED HISTORY: (As transcribed by Technologist)  Pt in from home. Pt had blood drawn a couple weeks ago and had a follow up appointment with infectious disease who recommended he come here for elevated creatinine and shortness of breath. Pt says   he has shortness of breath on exertion. Pt reports bilateral ankle swelling.      FINDINGS:  Cardiac silhouette is unremarkable.  Mild apically redistribution of pulmonary vasculature.  No consolidation, pleural effusion or pneumothorax.  IMPRESSION:  Mild pulmonary vascular congestion.  No consolidation.        LOCATION:  [place] [address]                 Dictated by (CST): Brian Tan MD on 1/29/2025 at 4:09 PM      Finalized by (CST): Brian Tan MD on 1/29/2025 at 4:11 PM       MDM    Differential diagnosis includes but is not limited to congestive heart failure, pleural effusions, acute kidney injury, electrolyte abnormality, hypertensive urgency.  The patient is likely had progressively worsening kidney function over the past 5 years.  This is likely led to renovascular hypertension as well as his primary hypertension worsening his renal function.  The patient had laboratory sent.  His sodium was 147 with a chloride of 116 CO2 of 16.  BUN of 63 creatinine of 8.4.  GFR is 6.  Urinalysis 3-5 red blood cells per high-power field.  +1 blood just protein noted.  Rare bacteria few squamous cells.  Hemoglobin 12.4 hematocrit 36.9 platelet count 140,000.  Sanz, 4100.  Chest x-ray performed  I reviewed the x-rays and agree with the radiologist report that showed cardiac silhouette is unremarkable.  Mild apically restricts revision of pulmonary vasculature.  No consolidation pleural effusion or pneumothorax.  Mild pulmonary vascular congestion.  Admission disposition: 1/29/2025  3:45 PM       Patient's blood pressure did come down to 179/125.  After much conversation the patient was finally convinced to stay in the  [facility].  I did speak to the hospitalist service as well as the renal service Dr. Frances.  I also spoke to Dr. Mendoza regarding the patient.  The patient will be admitted for continued care      Clinical Impression:  1. Acute renal failure superimposed on chronic kidney disease, unspecified acute renal failure type, unspecified CKD stage    2. HIV infection, unspecified symptom status (HCC)    3. Hypertensive urgency         Disposition:  Admit    Current Discharge Medication List             Present on Admission  Date Reviewed: 6/5/2017            ICD-10-CM Noted POA    * (Principal) Acute renal failure superimposed on chronic kidney disease, unspecified acute renal failure type, unspecified CKD stage N17.9, N18.9 1/29/2025 Unknown    RAF (acute kidney injury) N17.9 1/29/2025 Unknown    CKD (chronic kidney disease) stage 4, GFR 15-29 ml/min (HCC) N18.4 1/29/2025 Unknown    Glomerulonephritis N05.9 1/29/2025 Unknown    HIV infection (HCC) Z21 1/23/2012 Yes    HIV infection, unspecified symptom status ([facility]) Z21 1/29/2025 Unknown    Hypernatremia E87.0 1/29/2025 Yes    Hypertensive urgency I16.0 1/29/2025 Unknown    Metabolic acidosis E87.20 1/29/2025 Yes    Thrombocytopenia ([facility]) D69.6 1/29/2025 Yes                1/29 consult Nephrology      Assessment / Plan:     1) CKD 5 / probable ESRD- h/o biopsy proven membranous nephropathy (primary vs secondary) 2012 Cr 1.4 -> 3.0 2017; now likely with superimposed hypertensive nephrosclerosis- doubt reversibility regardless of etiology (GN, HIV, HTN). Lytes OK; + met acidosis, mild anemia, likely somewhat volume up. PLAN- eval urine / US; will need dialysis     2) Uncontrolled HTN- exac by CKD + med noncompliance. Start BB / CCB + IV GARCÍA     3) HIV- off HAART (truvada / isentress) x 2+ yrs; eval as per ID (Dr. Mendoza)        Reason for Consultation:  RAF / CKD / HTN    Physical Exam:  BP (!) 179/125   Pulse 104   Temp 97.2 °F (36.2 °C) (Temporal)   Resp 22   Ht 5' 8\"  (1.727 m)   Wt 203 lb (92.1 kg)   SpO2 97%   BMI 30.87 kg/m²   Temp (24hrs), Av.2 °F (36.2 °C), Min:97.2 °F (36.2 °C), Max:97.2 °F (36.2 °C)     General: awake alert  HEENT: No scleral icterus, MMM  Neck: Supple, no TANI or thyromegaly  Cardiac: Regular rate and rhythm, S1, S2 normal, no murmur, rub, or gallop  Lungs: Decreased breath sounds at the bases bilaterally.   Abdomen: Soft, non-tender. + bowel sounds, no palpable organomegaly  Extremities: Without clubbing, cyanosis; trace LE edema  Neurologic: Cranial nerves grossly intact, moving all extremities  Skin: Warm and dry, no rashes          H&P    Chief Complaint: RAF        Subjective:  History of Present Illness:      Tariq Joe is a 64 year old male with medical history of HIV not on medications since summer of 2022 who went to see Infectious disease as an outpatient today to establish care.  He was having symptoms of shortness of breath for the past 3 weeks and was noted to have a BP of 202/120.  He had labs done which showed a Cr of 7.6 and he was advised to go to the ER for further care.    He denies a cough, fever, chills, palpitations.          Objective:  Physical Exam:    BP (!) 179/125   Pulse 104   Temp 97.2 °F (36.2 °C) (Temporal)   Resp 22   Ht 5' 8\" (1.727 m)   Wt 203 lb (92.1 kg)   SpO2 97%   BMI 30.87 kg/m²   General: No acute distress, Alert  Respiratory: No rhonchi, no wheezes  Cardiovascular: S1, S2. Regular rate and rhythm  Abdomen: Soft, Non-tender, non-distended, positive bowel sounds  Neuro: No new focal deficits  Extremities: No edema        Assessment & Plan:  #Acute renal failure  -Cr 8.49  -renal consult  -nearing HD     #Hypernatremia     #Hypertensive urgency  -not on meds for hypertension, previously on lisinopril  -start labetalol IV  -resume lisinopril  -ECHO     #Shortness of breath  -suspect due to uncontrolled HTN  -CXR clear  -ECHO     #HIV  #h/o neurosyphilis  -ID on consult  -not on meds  currently  -CD4- 4, VL- 75K         1/30 IM    Subjective:       - RN notified that pt had sudden onset of SVT, rates in 150s after getting up to go to the bathroom. Pt feeling well, denies cp, palpitations, SOB. No known hx Afib/flutter or arrhythmia         Physical Exam:    BP (!) 159/106 (BP Location: Left arm)   Pulse 87   Temp 98.6 °F (37 °C) (Oral)   Resp 14   Ht 5' 8\" (1.727 m)   Wt 203 lb (92.1 kg)   SpO2 95%   BMI 30.87 kg/m²   General: NAD  Respiratory: CTAB  Cardiovascular: Tachycardic, regular  Abdomen: Soft NT ND  Neurologic: No focal deficits  Extremities: WWP      ASSESSMENT / PLAN:      # SVT   - EKG obtained showing SVT, suspect Aflutter given regular rate    - pt responded to IV diltiazem 10mg bolus, started on diltiazem ggt   - TTE already ordered previously, TSH ordered   - Cardiology consulted      Upon my evaluation, this patient had a high probability of imminent or life-threatening deterioration due to arrhythmia , which required my direct attention, intervention, and personal management.     I have personally provided 32 minutes of critical care time, exclusive of time spent on separately billable procedures.  Time includes review of all pertinent laboratory/radiology results, discussion with consultants, and monitoring for potential decompensation.  Performed interventions included anti-arrhythmics.       1/30 consult ID    Requested by Dr. Hernandez     Reason for Consultation:  HIV infection stage 3, long time intermittent non adherent, admitted for worsening CKD     History of Present Illness:  Tariq Joe is a a(n) 64 year old male, diagnosed with HIV in 2003, 22 years ago.   He was well controlled on ART, but had stopped all meds, and lost to follow up since 2019. He came to my office yesterday to reestablish care.  Labs were done prior to the appotintment at [facility] but were not sent to my office.   During appointment we learned creat was 7.6.  He also had elevated BP  202/120, and was complaining of dyspnea on exertion. He was sent to ED for evaluations, and was admitted.   Dr. Frances is reccomending hemodialysis.      He was a longstanding patient of Dr. Patel, who referred him to me in 2003, at the time of diagnosis,   He reports being heterosexual and denies history of IVDA.  He was formally in the . He is  over a decade ago.     He was asymptomatic at diagnosis in 2003, and CD4 was > 500. He was initially treated with trizivir for a few years, but then stopped and lost to follow up until 2008.    In 2008 he was brought back into care when he presented with MRSA soft tissue infection.  CD4 had dropped to 70 in 2008, and was started on atripla.  CD4 improved to 276 and viral load was undetectable by 2011.  He was switched to Truvada and isentress in 2011.  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  He received PCN x 3 doses after that.   He did develop symptoms of peripheral neuropathy and underwent an LP ijn 7/20212.  CSF VDRL was positive, but did not have pleocytosis.  He was treated with IV PCN x 2 weeks  after that.   He had been adherent with antiretroviral for several years, but then was lost to follow up since 2019.  He came to see me again in 2021, but did not get labs done, and didn't return after that for 4 more years.     He has been diagnosed with CKD, creat was up to 3.0 in 2018.  He had a renal biopsy done, but had not been following with Dr. Frances after that.       Physical Exam:     Awake   Vital signs: Temp:  [97.2 °F (36.2 °C)-98.6 °F (37 °C)] 98.4 °F (36.9 °C)  Pulse:  [] 91  Resp:  [14-26] 21  BP: (135-218)/() 177/106  SpO2:  [95 %-100 %] 95 %  Body mass index is 28.92 kg/m².  HEENT: Moist mucous membranes. Extraocular muscles are intact.  Neck: No swelling, no masses  Respiratory: Non labored, symmetric exursion  Cardiovascular: No irregularities in  rhythm  Abdomen: Soft, nontender, nondistended.    Musculoskeletal: Full range of motion of all extremities.  No swelling noted.  Joints: no effusions  Skin: No lesions. No erythema, no open wounds            Recent Labs   Lab 01/29/25  1406 01/30/25  0627   GLU 90 85   BUN 63* 68*   CREATSERUM 8.49* 8.37*   CA 8.9 8.7   ALB 4.2  --    * 148*   K 4.7 4.6   * 115*   CO2 16.0* 18.0*   Established Problem list:      Patient Active Problem List   Diagnosis    HIV infection (HCC)    Elevated sed rate    Multifactorial peripheral neuropathy    Membranous nephropathy    Neurosyphilis (HCC)    Hypernatremia    Thrombocytopenia (HCC)    Metabolic acidosis    RAF (acute kidney injury)    CKD (chronic kidney disease) stage 4, GFR 15-29 ml/min ([facility])    Glomerulonephritis    Hypertensive urgency    Acute renal failure superimposed on chronic kidney disease, unspecified acute renal failure type, unspecified CKD stage    HIV infection, unspecified symptom status ([facility])    SVT (supraventricular tachycardia) ([facility])         ASSESSMENT/PLAN:  1. HIV stage 3  -CD4 was > 500 at diagnosis in 2003  Had been well controlled for years, but then stopped meds in 2019 (see HPI)  -  CD4, now 4, and viral load 75K, no history of resistant mutations on genotype, recent genoytpe no mutations     Can start renal adjusted lamivudine + Dolrutegravir  OI prophylaxis  Bactrim DS 1 tab 3x/week, after starts dialysis  If no dialysis would avoid risk of hyperkalemia  Consider dapsone as alternative, check G6PD first     2. Secondary syphillis in 2012, and possible neurosyphillus  Baseline RPR was 1:4,096 in 2012  Recently 1:32  -review old labs     3. ESRD  Admitted with SOB, hypertension, mild pulmonary edema  Dr. Frances recommending HD  Encouraged patient to initiate HD        4. HTN with complications /on meds per IM  Non adherent, restablish with primary care     5. Cardiac  Risk for CAD as well as uremic pericarditis, and HTN disease      Multiple risk factors and was on no meds,  sob prior to admission, SVT  -longstanding HTN, not treated,   Cardiac workup in progress        6. SOB  Evidence of mild pulmonary edema in association with ESRD  Cardiac workup in progress, troponin and ECHO pending     Risk of PJP with CD4 4, not on 02  -follow serial CXR with dialysis  -bactrim prophylaxis to be intitated when dialysis starts         MEDICATIONS ADMINISTERED IN LAST 1 DAY:  amLODIPine (Norvasc) tab 10 mg       Date Action Dose Route User    1/29/2025 1848 Given 10 mg Oral Lroen Vargas RN          amLODIPine (Norvasc) tab 10 mg       Date Action Dose Route User    1/30/2025 1121 Given 10 mg Oral Katerina Bradford RN          dilTIAZem (cardIZEM) 100 mg in sodium chloride 0.9% 100 mL IVPB-ADDV       Date Action Dose Route User    1/30/2025 0300 Rate/Dose Change 2.5 mg/hr Intravenous Rakel Dove RN    1/30/2025 0037 New Bag 5 mg/hr Intravenous Rakel Dove RN          dilTIAZem (cardIZEM) 25 mg/5mL injection       Date Action Dose Route User    1/30/2025 0030 Given 10 mg (none) Rakle Dove RN          furosemide (Lasix) 10 mg/mL injection 40 mg       Date Action Dose Route User    1/29/2025 1852 Given 40 mg Intravenous Loren Vargas RN          heparin (Porcine) 5000 UNIT/ML injection 5,000 Units       Date Action Dose Route User    1/30/2025 0807 Given 5,000 Units Subcutaneous (Right Lower Abdomen) Katernia Bradford RN    1/29/2025 2030 Given 5,000 Units Subcutaneous (Left Lower Abdomen) Rakel Dove RN          lisinopril (Prinivil; Zestril) tab 2.5 mg       Date Action Dose Route User    1/30/2025 0806 Given 2.5 mg Oral Katerina Bradford RN    1/29/2025 1858 Given 2.5 mg Oral Loren Vargas RN          metoprolol tartrate (Lopressor) tab 25 mg       Date Action Dose Route User    1/30/2025 0618 Given 25 mg Oral Rakel Dove RN    1/29/2025 1848 Given 25 mg Oral Loren Vargas, RN          Perflutren Lipid Microsphere  (DEFINITY) 6.52 MG/ML injection 1.5 mL       Date Action Dose Route User    1/30/2025 1021 Given 1.5 mL Intravenous Dominique Beltran            Vitals (last day)       Date/Time Temp Pulse Resp BP SpO2 Weight O2 Device O2 Flow Rate (L/min) [facility]    01/30/25 1200 97.7 °F (36.5 °C) 87 15 152/110 95 % -- None (Room air) -- AB    01/30/25 0900 -- 79 13 -- 92 % -- -- -- SB    01/30/25 0800 98.4 °F (36.9 °C) 75 17 150/109 93 % -- None (Room air) -- AB    01/30/25 0618 -- -- -- 177/106 -- -- -- --     01/30/25 0615 -- -- -- -- -- 190 lb 3.2 oz (86.3 kg) -- -- SV    01/30/25 0400 98.4 °F (36.9 °C) 91 21 152/111 95 % -- None (Room air) --     01/30/25 0029 -- 97 26 135/96 -- -- -- --     01/30/25 0005 -- 152 22 143/103 -- -- -- --     01/30/25 0005 98.5 °F (36.9 °C) -- -- -- 97 % -- None (Room air) --     01/29/25 2000 98.6 °F (37 °C) 87 14 159/106 95 % -- None (Room air) --     01/29/25 1800 98.6 °F (37 °C) 97 23 179/119 96 % -- None (Room air) -- AB    01/29/25 1800 -- -- -- -- -- 203 lb (92.1 kg) -- --     01/29/25 1700 -- 97 22 198/123 100 % -- None (Room air) --     01/29/25 1630 -- 96 15 192/123 100 % -- None (Room air) --     01/29/25 1521 -- 104 22 179/125 97 % -- None (Room air) --     01/29/25 1409 -- 101 18 173/128 97 % -- None (Room air) --     01/29/25 1400 -- -- -- -- -- -- None (Room air) --     01/29/25 1257 -- -- -- 213/129 -- -- -- --     01/29/25 1237 97.2 °F (36.2 °C) 105 18 218/127 98 % 203 lb (92.1 kg) None (Room air) -- AP

## 2025-01-30 NOTE — CONSULTS
[facility]  Cardiology Consultation    Tariq Joe Patient Status:  Inpatient    1960 MRN [id number]   Location [facility] 7NE-A Attending Kamla Hernandez MD   Hosp Day # 1 PCP No primary care provider on file.     Reason for Consultation:  Cardiomyopathy/SVT    History of Present Illness:  Tariq Joe is a a(n) 64 year old male who presents with worsening renal function.      Patient has a past medical history of advanced HIV, chronic kidney disease who presents with worsening renal function.  He has had worsening acute kidney injury and is being scheduled for dialysis.  Overnight had an episode of SVT.  Cardiology was subsequently consulted.    Currently doing okay.  No chest pain, pressure no palpitations.  No shortness of breath, lightheadedness, dizziness, presyncope or syncope.    Denies prior cardiac history.  Unfortunately, he has very poor medical compliance.  Has not been taking medication for past many years.  Has not seen a doctor for many years.    Allergies:  Allergies[1]    Medications:    Current Facility-Administered Medications:     dilTIAZem 10 mg BOLUS FROM BAG infusion, 10 mg, Intravenous, Once    dilTIAZem (cardIZEM) 100 mg in sodium chloride 0.9% 100 mL IVPB-ADDV, 2.5-20 mg/hr, Intravenous, Continuous    amLODIPine (Norvasc) tab 10 mg, 10 mg, Oral, Daily    labetalol (Trandate) 5 mg/mL injection 10 mg, 10 mg, Intravenous, Q6H PRN    heparin (Porcine) 5000 UNIT/ML injection 5,000 Units, 5,000 Units, Subcutaneous, 2 times per day    acetaminophen (Tylenol Extra Strength) tab 500 mg, 500 mg, Oral, Q4H PRN    ondansetron (Zofran) 4 MG/2ML injection 4 mg, 4 mg, Intravenous, Q6H PRN    prochlorperazine (Compazine) 10 MG/2ML injection 5 mg, 5 mg, Intravenous, Q8H PRN    polyethylene glycol (PEG 3350) (Miralax) 17 g oral packet 17 g, 17 g, Oral, Daily PRN    sennosides (Senokot) tab 17.2 mg, 17.2 mg, Oral, Nightly PRN    bisacodyl (Dulcolax) 10 MG rectal  suppository 10 mg, 10 mg, Rectal, Daily PRN    metoprolol tartrate (Lopressor) tab 25 mg, 25 mg, Oral, 2x Daily(Beta Blocker)    hydrALAzine (Apresoline) 20 mg/mL injection 10 mg, 10 mg, Intravenous, Q4H PRN    influenza virus trivalent PF (Fluzone Trivalent) 0.5 mL IM injection (ages 6 months to 64 years) 0.5 mL, 0.5 mL, Intramuscular, Prior to discharge    Review of Systems:  A comprehensive review of systems was negative if not otherwise mention in above HPI.    BP (!) 152/110 (BP Location: Left arm)   Pulse 87   Temp 97.7 °F (36.5 °C) (Oral)   Resp 15   Ht 5' 8\" (1.727 m)   Wt 190 lb 3.2 oz (86.3 kg)   SpO2 95%   BMI 28.92 kg/m²   Temp (24hrs), Av.4 °F (36.9 °C), Min:97.7 °F (36.5 °C), Max:98.6 °F (37 °C)       Intake/Output Summary (Last 24 hours) at 2025 1602  Last data filed at 2025 0810  Gross per 24 hour   Intake 140 ml   Output 700 ml   Net -560 ml     Wt Readings from Last 3 Encounters:   25 190 lb 3.2 oz (86.3 kg)   17 199 lb (90.3 kg)   17 197 lb (89.4 kg)       Physical Exam:   General: Alert and oriented x 3. No apparent distress. No respiratory or constitutional distress.  HEENT: Normocephalic  Neck: No JVD  Cardiac: Regular rate and rhythm. S1, S2 normal. No murmur  Lungs: Clear without wheezes, rales, rhonchi or dullness.   Abdomen: Soft, non-tender.   Extremities: Without edema.  Peripheral pulses are 2+.  Neurologic: Alert and oriented, normal affect.  Skin: Warm and dry.     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  This is SVT likely AVNRT.  Agree with metoprolol.    His echo shows cardiomyopathy.  This is new onset.  No prior history.    Given his kidney function as well as medication noncompliance, would initially start on goal-directed medical therapy.  Once on dialysis, would consider ischemic evaluation based on patient preferences.    Will continue to follow.  Discussed in detail with patient.  Echo was reviewed with patient.    Thank you for allowing me to participate in the care of your patient.    Samuel Williamson MD  1/30/2025  4:02 PM       [1] No Known Allergies

## 2025-01-31 ENCOUNTER — PATIENT OUTREACH (OUTPATIENT)
Dept: CASE MANAGEMENT | Age: 65
End: 2025-01-31

## 2025-01-31 DIAGNOSIS — N18.5 ANEMIA DUE TO STAGE 5 CHRONIC KIDNEY DISEASE, NOT ON CHRONIC DIALYSIS (HCC): ICD-10-CM

## 2025-01-31 DIAGNOSIS — I10 PRIMARY HYPERTENSION: ICD-10-CM

## 2025-01-31 DIAGNOSIS — D63.1 ANEMIA DUE TO STAGE 5 CHRONIC KIDNEY DISEASE, NOT ON CHRONIC DIALYSIS (HCC): ICD-10-CM

## 2025-01-31 DIAGNOSIS — N18.5 CKD (CHRONIC KIDNEY DISEASE) STAGE 5, GFR LESS THAN 15 ML/MIN (HCC): Primary | ICD-10-CM

## 2025-01-31 LAB
G6PD QN: 287 U/10E12 RBC
RBC: 3.9 X10E6/UL

## 2025-01-31 RX ORDER — AMLODIPINE BESYLATE 10 MG/1
10 TABLET ORAL DAILY
Qty: 90 TABLET | Refills: 3 | Status: SHIPPED | OUTPATIENT
Start: 2025-01-31

## 2025-01-31 RX ORDER — METOPROLOL SUCCINATE 25 MG/1
25 TABLET, EXTENDED RELEASE ORAL DAILY
Qty: 90 TABLET | Refills: 3 | Status: SHIPPED | OUTPATIENT
Start: 2025-01-31 | End: 2026-01-26

## 2025-01-31 NOTE — PROGRESS NOTES
Hospital follow up.  TCM reached out for scheduling assistance.    TCC request.    Patient will call back to schedule.    Confirmed with patient.    Closing encounter.

## 2025-01-31 NOTE — DISCHARGE SUMMARY
[facility]  DISCHARGE SUMMARY     Tariq Joe Patient Status:  Inpatient    1960 MRN [id number]   Location [facility] 7NE-A Attending No att. providers found   Hosp Day # 1 PCP No primary care provider on file.     Date of Admission: 2025  Date of Discharge:  2025     Discharge Disposition: Left Against Medical Advice    Discharge Diagnosis and Brief synopsis:  #Acute renal failure on CKD approaching ESRD  -Cr 8.37  -renal consult  -nearing HD     #SVT/atrial flutter  Now on cardizem gtt  Echo pending  Cardiology to eval   Risk for uremic pericarditis      #Hypernatremia, slightly higher today, per renal     #Hypertensive urgency  -BP improved  -started on ACE  -echo pending     #Shortness of breath  -suspect due to uncontrolled HTN  -CXR clear  -ECHO pending     #HIV  #h/o neurosyphilis  -ID on consult  -not on meds currently  -CD4- 4, VL- 75K  -resume HIV meds (renally adjusted)  -Bactrim for prophylaxis after HD    History of Present Illness:   Tariq Joe is a 64 year old male with medical history of HIV not on medications since summer of 2022 who went to see Infectious disease as an outpatient today to establish care.  He was having symptoms of shortness of breath for the past 3 weeks and was noted to have a BP of 202/120.  He had labs done which showed a Cr of 7.6 and he was advised to go to the ER for further care.    He denies a cough, fever, chills, palpitations.           Lace+ Score: 31  59-90 High Risk  29-58 Medium Risk  0-28   Low Risk  Patient was referred to the [facility].    TCM Follow-Up Recommendation:  LACE 29-58: Moderate Risk of readmission after discharge from the hospital.      Procedures during hospitalization:   none    Incidental or significant findings and recommendations (brief descriptions):  As above    Lab/Test results pending at Discharge:   none    Consultants:  Cardiology, renal, ID    Discharge Medication List:      Discharge Medications        START taking these medications        Instructions Prescription details   dolutegravir 50 MG Tabs  Commonly known as: Tivicay      Take 1 tablet (50 mg total) by mouth daily.   Quantity: 90 tablet  Refills: 0     lamiVUDine 100 MG Tabs  Commonly known as: EPIVIR      Take 1 tablet (100 mg total) by mouth daily.   Quantity: 90 tablet  Refills: 0               Where to Get Your Medications        These medications were sent to [id number] - [place], [place] -  [address], [phone], [phone]   [address], [facility] [address]      Phone: [phone]   dolutegravir 50 MG Tabs  lamiVUDine 100 MG Tabs         [place]    Follow-up appointment:   No follow-up provider specified.  Appointments for Next 30 Days 2025 - 3/2/2025      None            Vital signs:  Temp:  [97.7 °F (36.5 °C)] 97.7 °F (36.5 °C)  Pulse:  [87] 87  Resp:  [15] 15  BP: (152)/(110) 152/110  SpO2:  [95 %] 95 %    Physical Exam:    General: No acute distress   Lungs: clear to auscultation  Cardiovascular: S1, S2  Abdomen: Soft      -----------------------------------------------------------------------------------------------  PATIENT DISCHARGE INSTRUCTIONS: See electronic chart    Bette Pickard MD    Total time spent on discharge plannin minutes     The  [employer] Cures Act makes medical notes like these available to patients in the interest of transparency. Please be advised this is a medical document. Medical documents are intended to carry relevant information, facts as evident, and the clinical opinion of the practitioner. The medical note is intended as peer to peer communication and may appear blunt or direct. It is written in medical language and may contain abbreviations or verbiage that are unfamiliar.

## 2025-02-07 NOTE — PAYOR COMM NOTE
PLEASE FAX DETERMINATION OF DAYS [phone]      --------------  DISCHARGE REVIEW    Payor: MARTIN NEVES  Subscriber #:  [id number]  Authorization Number: [id number]    Admit date: 25  Admit time:   5:53 PM  Discharge Date: 2025  4:56 PM     Admitting Physician: Kamla Hernandez MD  Attending Physician:  No att. providers found  Primary Care Physician: No primary care provider on file.          Discharge Summary Notes        Discharge Summary signed by Bette Pickard MD at 2025 10:19 AM       Author: Bette Pickard MD Specialty: HOSPITALIST Author Type: Physician    Filed: 2025 10:19 AM Date of Service: 2025 10:18 AM Status: Signed    : Bette Pickard MD (Physician)           [facility]  DISCHARGE SUMMARY     Tariq Joe Patient Status:  Inpatient    1960 MRN [id number]   Location [facility] 7NE-A Attending No att. providers found   Hosp Day # 1 PCP No primary care provider on file.     Date of Admission: 2025  Date of Discharge:  2025     Discharge Disposition: Left Against Medical Advice    Discharge Diagnosis and Brief synopsis:  #Acute renal failure on CKD approaching ESRD  -Cr 8.37  -renal consult  -nearing HD     #SVT/atrial flutter  Now on cardizem gtt  Echo pending  Cardiology to eval   Risk for uremic pericarditis      #Hypernatremia, slightly higher today, per renal     #Hypertensive urgency  -BP improved  -started on ACE  -echo pending     #Shortness of breath  -suspect due to uncontrolled HTN  -CXR clear  -ECHO pending     #HIV  #h/o neurosyphilis  -ID on consult  -not on meds currently  -CD4- 4, VL- 75K  -resume HIV meds (renally adjusted)  -Bactrim for prophylaxis after HD    History of Present Illness:   Tariq Joe is a 64 year old male with medical history of HIV not on medications since summer of 2022 who went to see Infectious disease as an outpatient today to establish care.  He was having symptoms of shortness of breath  for the past 3 weeks and was noted to have a BP of 202/120.  He had labs done which showed a Cr of 7.6 and he was advised to go to the ER for further care.    He denies a cough, fever, chills, palpitations.           Lace+ Score: 31  59-90 High Risk  29-58 Medium Risk  0-28   Low Risk  Patient was referred to the [facility].    TCM Follow-Up Recommendation:  LACE 29-58: Moderate Risk of readmission after discharge from the hospital.      Procedures during hospitalization:   none    Incidental or significant findings and recommendations (brief descriptions):  As above    Lab/Test results pending at Discharge:   none    Consultants:  Cardiology, renal, ID    Discharge Medication List:     Discharge Medications        START taking these medications        Instructions Prescription details   dolutegravir 50 MG Tabs  Commonly known as: Tivicay      Take 1 tablet (50 mg total) by mouth daily.   Quantity: 90 tablet  Refills: 0     lamiVUDine 100 MG Tabs  Commonly known as: EPIVIR      Take 1 tablet (100 mg total) by mouth daily.   Quantity: 90 tablet  Refills: 0               Where to Get Your Medications        These medications were sent to [id number] - [place], [place] - [facility] [address] AT [address], [phone], [phone]  [facility] [address], [facility] [address]      Phone: [phone]   dolutegravir 50 MG Tabs  lamiVUDine 100 MG Tabs         [place]    Follow-up appointment:   No follow-up provider specified.  Appointments for Next 30 Days 1/31/2025 - 3/2/2025      None            Vital signs:  Temp:  [97.7 °F (36.5 °C)] 97.7 °F (36.5 °C)  Pulse:  [87] 87  Resp:  [15] 15  BP: (152)/(110) 152/110  SpO2:  [95 %] 95 %    Physical Exam:    General: No acute distress   Lungs: clear to auscultation  Cardiovascular: S1, S2  Abdomen:  Soft      -----------------------------------------------------------------------------------------------  PATIENT DISCHARGE INSTRUCTIONS: See electronic chart    Bette Pickard MD    Total time spent on discharge plannin minutes     The  Cent[employer] Cures Act makes medical notes like these available to patients in the interest of transparency. Please be advised this is a medical document. Medical documents are intended to carry relevant information, facts as evident, and the clinical opinion of the practitioner. The medical note is intended as peer to peer communication and may appear blunt or direct. It is written in medical language and may contain abbreviations or verbiage that are unfamiliar.       Electronically signed by Bette Pickard MD on 2025 10:19 AM         REVIEWER COMMENTS

## (undated) NOTE — MR AVS SNAPSHOT
[address], [address] [address] [phone]               Thank you for choosing us for your health care visit with Blayne Capellan MD.  We are glad to serve you and happy to provide you with this This list is accurate as of: 6/5/17  4:24 PM.  Always use your most recent med list.                ISENTRESS 400 MG Tabs   Generic drug:  raltegravir Potassium   1 TABLET TWICE DAILY           lisinopril 5 MG Tabs   Take 5 mg by mouth daily.    Commonly kn walking, light jogging, cycling, swimming, etc.) for a goal of at least 150 minutes per week. Moderation of alcohol consumption Men: limit to <= 2 drinks* per day. Women and lighter weight persons: limit to <= 1 drink* per day.

## (undated) NOTE — MR AVS SNAPSHOT
[facility], [address], [address] [facility] [address] [address] [phone]               Thank you for choosing us for your health care visit with Chivo Burnham MD.  We are glad to serve you and happy to provide you with this Assoc Dx:  Nephrolithiasis [N20.0], CKD (chronic kidney disease), stage 4 (severe) (HCC) [N18.4], Flank pain [R10.9]           Protein,Total,Urine, Random [E]    Complete by:  Jan 31, 2017    Assoc Dx:  Nephrolithiasis [N20.0], CKD (chronic kidney disease [facility]   at [phone]. [employer]     Sign up for [facility], your secure online medical record. [employer] will allow you to access patient instructions from your recent visit,  view other health information, and more.  To si Get your heart pumping – brisk walking, biking, swimming Even 10 minute increments are effective and add up over the week   2 ½ hours per week – spread out over time Use a karan to keep you motivated   Don’t forget strength training with weights and resist